# Patient Record
Sex: MALE | Race: WHITE | NOT HISPANIC OR LATINO | Employment: FULL TIME | URBAN - METROPOLITAN AREA
[De-identification: names, ages, dates, MRNs, and addresses within clinical notes are randomized per-mention and may not be internally consistent; named-entity substitution may affect disease eponyms.]

---

## 2017-10-20 ENCOUNTER — APPOINTMENT (OUTPATIENT)
Dept: LAB | Facility: CLINIC | Age: 37
End: 2017-10-20
Payer: COMMERCIAL

## 2017-10-20 ENCOUNTER — TRANSCRIBE ORDERS (OUTPATIENT)
Dept: LAB | Facility: CLINIC | Age: 37
End: 2017-10-20

## 2017-10-20 DIAGNOSIS — J35.8 ADENOID VEGETATIONS: ICD-10-CM

## 2017-10-20 DIAGNOSIS — D68.8 FAMILIAL MULTIPLE FACTOR DEFICIENCY SYNDROME (HCC): Primary | ICD-10-CM

## 2017-10-20 LAB
APTT PPP: 29 SECONDS (ref 23–35)
BASOPHILS # BLD AUTO: 0.04 THOUSANDS/ΜL (ref 0–0.1)
BASOPHILS NFR BLD AUTO: 1 % (ref 0–1)
EOSINOPHIL # BLD AUTO: 0.36 THOUSAND/ΜL (ref 0–0.61)
EOSINOPHIL NFR BLD AUTO: 7 % (ref 0–6)
ERYTHROCYTE [DISTWIDTH] IN BLOOD BY AUTOMATED COUNT: 12.1 % (ref 11.6–15.1)
HCT VFR BLD AUTO: 41.6 % (ref 36.5–49.3)
HGB BLD-MCNC: 13.9 G/DL (ref 12–17)
INR PPP: 0.99 (ref 0.86–1.16)
LYMPHOCYTES # BLD AUTO: 1.95 THOUSANDS/ΜL (ref 0.6–4.47)
LYMPHOCYTES NFR BLD AUTO: 36 % (ref 14–44)
MCH RBC QN AUTO: 30.2 PG (ref 26.8–34.3)
MCHC RBC AUTO-ENTMCNC: 33.4 G/DL (ref 31.4–37.4)
MCV RBC AUTO: 90 FL (ref 82–98)
MONOCYTES # BLD AUTO: 0.42 THOUSAND/ΜL (ref 0.17–1.22)
MONOCYTES NFR BLD AUTO: 8 % (ref 4–12)
NEUTROPHILS # BLD AUTO: 2.66 THOUSANDS/ΜL (ref 1.85–7.62)
NEUTS SEG NFR BLD AUTO: 48 % (ref 43–75)
NRBC BLD AUTO-RTO: 0 /100 WBCS
PLATELET # BLD AUTO: 220 THOUSANDS/UL (ref 149–390)
PMV BLD AUTO: 10.3 FL (ref 8.9–12.7)
PROTHROMBIN TIME: 13.1 SECONDS (ref 12.1–14.4)
RBC # BLD AUTO: 4.6 MILLION/UL (ref 3.88–5.62)
WBC # BLD AUTO: 5.44 THOUSAND/UL (ref 4.31–10.16)

## 2017-10-20 PROCEDURE — 85610 PROTHROMBIN TIME: CPT

## 2017-10-20 PROCEDURE — 85730 THROMBOPLASTIN TIME PARTIAL: CPT

## 2017-10-20 PROCEDURE — 36415 COLL VENOUS BLD VENIPUNCTURE: CPT

## 2017-10-20 PROCEDURE — 85025 COMPLETE CBC W/AUTO DIFF WBC: CPT

## 2017-10-20 NOTE — PRE-PROCEDURE INSTRUCTIONS
No outpatient prescriptions have been marked as taking for the 10/31/17 encounter [facility] Encounter)  Patient denies being on any medications

## 2017-10-20 NOTE — PROGRESS NOTES
Instructed patient on use of Chlorhexidine for preoperative bathing per hospital protocol  Also instructed patient to stop NSAIDS and supplements one week preop

## 2017-10-30 ENCOUNTER — ANESTHESIA EVENT (OUTPATIENT)
Dept: PERIOP | Facility: HOSPITAL | Age: 37
End: 2017-10-30
Payer: COMMERCIAL

## 2017-10-31 ENCOUNTER — HOSPITAL ENCOUNTER (OUTPATIENT)
Facility: HOSPITAL | Age: 37
Setting detail: OUTPATIENT SURGERY
Discharge: HOME | End: 2017-10-31
Attending: OTOLARYNGOLOGY | Admitting: OTOLARYNGOLOGY
Payer: COMMERCIAL

## 2017-10-31 ENCOUNTER — ANESTHESIA (OUTPATIENT)
Dept: PERIOP | Facility: HOSPITAL | Age: 37
End: 2017-10-31
Payer: COMMERCIAL

## 2017-10-31 ENCOUNTER — ANESTHESIA EVENT (OUTPATIENT)
Dept: PERIOP | Facility: HOSPITAL | Age: 37
End: 2017-10-31
Payer: COMMERCIAL

## 2017-10-31 VITALS
SYSTOLIC BLOOD PRESSURE: 104 MMHG | DIASTOLIC BLOOD PRESSURE: 53 MMHG | TEMPERATURE: 97.6 F | WEIGHT: 150 LBS | OXYGEN SATURATION: 95 % | RESPIRATION RATE: 16 BRPM | BODY MASS INDEX: 22.73 KG/M2 | HEART RATE: 50 BPM | HEIGHT: 68 IN

## 2017-10-31 DIAGNOSIS — J35.8 OTHER CHRONIC DISEASES OF TONSILS AND ADENOIDS (CODE): ICD-10-CM

## 2017-10-31 PROCEDURE — 88304 TISSUE EXAM BY PATHOLOGIST: CPT | Performed by: OTOLARYNGOLOGY

## 2017-10-31 RX ORDER — ONDANSETRON 2 MG/ML
4 INJECTION INTRAMUSCULAR; INTRAVENOUS ONCE AS NEEDED
Status: DISCONTINUED | OUTPATIENT
Start: 2017-10-31 | End: 2017-10-31 | Stop reason: HOSPADM

## 2017-10-31 RX ORDER — GLYCOPYRROLATE 0.2 MG/ML
INJECTION INTRAMUSCULAR; INTRAVENOUS AS NEEDED
Status: DISCONTINUED | OUTPATIENT
Start: 2017-10-31 | End: 2017-10-31 | Stop reason: SURG

## 2017-10-31 RX ORDER — OXYCODONE HYDROCHLORIDE AND ACETAMINOPHEN 5; 325 MG/1; MG/1
TABLET ORAL
Refills: 0 | Status: ON HOLD
Start: 2017-10-31 | End: 2019-10-31

## 2017-10-31 RX ORDER — ROCURONIUM BROMIDE 10 MG/ML
INJECTION, SOLUTION INTRAVENOUS AS NEEDED
Status: DISCONTINUED | OUTPATIENT
Start: 2017-10-31 | End: 2017-10-31 | Stop reason: SURG

## 2017-10-31 RX ORDER — SODIUM CHLORIDE 9 MG/ML
INJECTION, SOLUTION INTRAVENOUS AS NEEDED
Status: DISCONTINUED | OUTPATIENT
Start: 2017-10-31 | End: 2017-10-31 | Stop reason: HOSPADM

## 2017-10-31 RX ORDER — PROPOFOL 10 MG/ML
INJECTION, EMULSION INTRAVENOUS AS NEEDED
Status: DISCONTINUED | OUTPATIENT
Start: 2017-10-31 | End: 2017-10-31 | Stop reason: SURG

## 2017-10-31 RX ORDER — FENTANYL CITRATE/PF 50 MCG/ML
50 SYRINGE (ML) INJECTION
Status: DISCONTINUED | OUTPATIENT
Start: 2017-10-31 | End: 2017-10-31 | Stop reason: HOSPADM

## 2017-10-31 RX ORDER — SCOLOPAMINE TRANSDERMAL SYSTEM 1 MG/1
1 PATCH, EXTENDED RELEASE TRANSDERMAL ONCE AS NEEDED
Status: DISCONTINUED | OUTPATIENT
Start: 2017-10-31 | End: 2017-10-31 | Stop reason: HOSPADM

## 2017-10-31 RX ORDER — ONDANSETRON 2 MG/ML
4 INJECTION INTRAMUSCULAR; INTRAVENOUS EVERY 6 HOURS PRN
Status: DISCONTINUED | OUTPATIENT
Start: 2017-10-31 | End: 2017-10-31 | Stop reason: HOSPADM

## 2017-10-31 RX ORDER — MAGNESIUM HYDROXIDE 1200 MG/15ML
LIQUID ORAL AS NEEDED
Status: DISCONTINUED | OUTPATIENT
Start: 2017-10-31 | End: 2017-10-31 | Stop reason: HOSPADM

## 2017-10-31 RX ORDER — SODIUM CHLORIDE 9 MG/ML
125 INJECTION, SOLUTION INTRAVENOUS CONTINUOUS
Status: DISCONTINUED | OUTPATIENT
Start: 2017-10-31 | End: 2017-10-31 | Stop reason: HOSPADM

## 2017-10-31 RX ORDER — PREDNISONE 20 MG/1
TABLET ORAL
Refills: 0 | Status: ON HOLD
Start: 2017-10-31 | End: 2019-10-31

## 2017-10-31 RX ORDER — OXYCODONE HYDROCHLORIDE AND ACETAMINOPHEN 5; 325 MG/1; MG/1
2 TABLET ORAL EVERY 4 HOURS PRN
Status: DISCONTINUED | OUTPATIENT
Start: 2017-10-31 | End: 2017-10-31 | Stop reason: HOSPADM

## 2017-10-31 RX ORDER — FENTANYL CITRATE/PF 50 MCG/ML
25 SYRINGE (ML) INJECTION
Status: DISCONTINUED | OUTPATIENT
Start: 2017-10-31 | End: 2017-10-31 | Stop reason: HOSPADM

## 2017-10-31 RX ORDER — FENTANYL CITRATE 50 UG/ML
INJECTION, SOLUTION INTRAMUSCULAR; INTRAVENOUS AS NEEDED
Status: DISCONTINUED | OUTPATIENT
Start: 2017-10-31 | End: 2017-10-31 | Stop reason: SURG

## 2017-10-31 RX ORDER — SUCCINYLCHOLINE CHLORIDE 20 MG/ML
INJECTION INTRAMUSCULAR; INTRAVENOUS AS NEEDED
Status: DISCONTINUED | OUTPATIENT
Start: 2017-10-31 | End: 2017-10-31 | Stop reason: SURG

## 2017-10-31 RX ORDER — MIDAZOLAM HYDROCHLORIDE 1 MG/ML
INJECTION INTRAMUSCULAR; INTRAVENOUS AS NEEDED
Status: DISCONTINUED | OUTPATIENT
Start: 2017-10-31 | End: 2017-10-31 | Stop reason: SURG

## 2017-10-31 RX ADMIN — FENTANYL CITRATE 100 MCG: 50 INJECTION, SOLUTION INTRAMUSCULAR; INTRAVENOUS at 11:34

## 2017-10-31 RX ADMIN — ONDANSETRON HYDROCHLORIDE 4 MG: 2 INJECTION, SOLUTION INTRAVENOUS at 09:19

## 2017-10-31 RX ADMIN — SUCCINYLCHOLINE CHLORIDE 120 MG: 20 INJECTION, SOLUTION INTRAMUSCULAR; INTRAVENOUS at 11:35

## 2017-10-31 RX ADMIN — FENTANYL CITRATE 100 MCG: 50 INJECTION, SOLUTION INTRAMUSCULAR; INTRAVENOUS at 09:01

## 2017-10-31 RX ADMIN — FENTANYL CITRATE 50 MCG: 50 INJECTION, SOLUTION INTRAMUSCULAR; INTRAVENOUS at 10:05

## 2017-10-31 RX ADMIN — SODIUM CHLORIDE 125 ML/HR: 0.9 INJECTION, SOLUTION INTRAVENOUS at 09:59

## 2017-10-31 RX ADMIN — SODIUM CHLORIDE: 0.9 INJECTION, SOLUTION INTRAVENOUS at 11:50

## 2017-10-31 RX ADMIN — OXYCODONE HYDROCHLORIDE AND ACETAMINOPHEN 1 TABLET: 5; 325 TABLET ORAL at 13:54

## 2017-10-31 RX ADMIN — NEOSTIGMINE METHYLSULFATE 1.5 MG: 1 INJECTION, SOLUTION INTRAMUSCULAR; INTRAVENOUS; SUBCUTANEOUS at 11:49

## 2017-10-31 RX ADMIN — PROPOFOL 200 MG: 10 INJECTION, EMULSION INTRAVENOUS at 09:03

## 2017-10-31 RX ADMIN — ROCURONIUM BROMIDE 30 MG: 10 INJECTION, SOLUTION INTRAVENOUS at 09:03

## 2017-10-31 RX ADMIN — SODIUM CHLORIDE 125 ML/HR: 0.9 INJECTION, SOLUTION INTRAVENOUS at 13:57

## 2017-10-31 RX ADMIN — NEOSTIGMINE METHYLSULFATE 3 MG: 1 INJECTION, SOLUTION INTRAMUSCULAR; INTRAVENOUS; SUBCUTANEOUS at 09:31

## 2017-10-31 RX ADMIN — DEXAMETHASONE SODIUM PHOSPHATE 8 MG: 10 INJECTION INTRAMUSCULAR; INTRAVENOUS at 09:10

## 2017-10-31 RX ADMIN — PROPOFOL 200 MG: 10 INJECTION, EMULSION INTRAVENOUS at 11:35

## 2017-10-31 RX ADMIN — ROCURONIUM BROMIDE 5 MG: 10 INJECTION, SOLUTION INTRAVENOUS at 11:35

## 2017-10-31 RX ADMIN — SODIUM CHLORIDE 125 ML/HR: 0.9 INJECTION, SOLUTION INTRAVENOUS at 08:02

## 2017-10-31 RX ADMIN — ROCURONIUM BROMIDE 10 MG: 10 INJECTION, SOLUTION INTRAVENOUS at 11:37

## 2017-10-31 RX ADMIN — MIDAZOLAM HYDROCHLORIDE 2 MG: 1 INJECTION, SOLUTION INTRAMUSCULAR; INTRAVENOUS at 08:59

## 2017-10-31 RX ADMIN — SCOPALAMINE 1 PATCH: 1 PATCH, EXTENDED RELEASE TRANSDERMAL at 08:01

## 2017-10-31 RX ADMIN — GLYCOPYRROLATE 0.5 MG: 0.2 INJECTION, SOLUTION INTRAMUSCULAR; INTRAVENOUS at 09:31

## 2017-10-31 RX ADMIN — GLYCOPYRROLATE 0.2 MG: 0.2 INJECTION, SOLUTION INTRAMUSCULAR; INTRAVENOUS at 11:50

## 2017-10-31 RX ADMIN — MIDAZOLAM HYDROCHLORIDE 2 MG: 1 INJECTION, SOLUTION INTRAMUSCULAR; INTRAVENOUS at 11:30

## 2017-10-31 RX ADMIN — MIDAZOLAM HYDROCHLORIDE 2 MG: 1 INJECTION, SOLUTION INTRAMUSCULAR; INTRAVENOUS at 11:40

## 2017-10-31 RX ADMIN — SODIUM CHLORIDE: 0.9 INJECTION, SOLUTION INTRAVENOUS at 08:57

## 2017-10-31 RX ADMIN — SODIUM CHLORIDE 125 ML/HR: 0.9 INJECTION, SOLUTION INTRAVENOUS at 12:08

## 2017-10-31 NOTE — OP NOTE
OPERATIVE REPORT  PATIENT NAME: Dulce Gurrola    :  1980  MRN: [id number]  Pt Location: [facility] OR ROOM 03    SURGERY DATE: 10/31/2017    Surgeon(s) and Role:     * Jeanie Sims DO - Primary    Preop Diagnosis:  Other chronic diseases of tonsils and adenoids (CODE) [J35 8]    Post-Op Diagnosis Codes:     * Other chronic diseases of tonsils and adenoids (CODE) [J35 8]    Procedure(s) (LRB):  TONSILLECTOMY (N/A)    Specimen(s):  ID Type Source Tests Collected by Time Destination   1 : BL Tonsils Tissue Tonsil TISSUE EXAM Jeanie Sims DO 10/31/2017 0441        Estimated Blood Loss:   10 mL    Drains:       Anesthesia Type:   General    Operative Indications: Other chronic diseases of tonsils and adenoids (CODE) [J35 8]      Operative Findings:  Cryptic tonsils with multiple pustules and tonsilliths     Complications:   None    Procedure and Technique:  Patient was identified in the holding area and taken to the OR  Placed on the OR table in supine position  Table was turned 90 degrees and prepped and draped in usual fashion for the above procedure  Time out was obtained and all information correct and agreed upon by surgeon, OR staff and anesthesia  The oral cavity was opened using a McGyver mouthgag and held in place with harris stand suspension  Evaluation of the oral cavity revealed that the left tonsil was grade 2 and the right tonsil was grade 2  Attention was first turned to the right tonsil  This was grasped with a curved Abena forceps and pulled medially  Using the coblation wand on the standard settings the tonsil was removed from the tonsillar fossa using the coblation setting  Any visible vessels which were seen just under the tissue were coagulated with the coagulation setting on the unit  The same procedure was then completed on the opposite side  At the completion of the tonsillectomy the fossae were dry    Soft suction catheter was then passed into the esophagus and stomach to remove any gastric contents and then removed  The mouthgag was let down and then reopened to evaluated the oropharyngeal area to make sure that there was no bleeding  Once confirmed, the mouthgag was removed and the bed turned back 90 degrees towards anesthesia  The patient was awoken, extubated and taken to the PACU in stable condition  I was present for the entire procedure    Patient Disposition:  PACU     SIGNATURE: Austin Domingo DO  DATE: October 31, 2017  TIME: 11:18 AM      Addendum:  After starting my following case I was called by the PACU nurses stating that there was some bleeding from the tonsillar fossa on the left  They have the patient irrigate with ice water gargles and the patient was to be visualized following that surgery  Upon seeing the patient he had significant clot in the left tonsillar fossa with some bright red blood  It was deemed appropriate to return to the operating room in an emergency setting to control the bleeding and cauterize the area

## 2017-10-31 NOTE — OP NOTE
OPERATIVE REPORT  PATIENT NAME: Bhavin Lorenzo    :  1980  MRN: [id number]  Pt Location: [facility] 03    SURGERY DATE: 10/31/2017    Surgeon(s) and Role:     * DO Andrea Ruiz Primary    Preop Diagnosis:  Post tonsillectomy hemorrhage       Procedure(s):  CONTROL BLEED POST TONSILLECTOMY    Specimen(s):  * No specimens in log *    Estimated Blood Loss:   10 mL from oral cavity then suctioned 150 mL of blood from stomach    Drains:       Anesthesia Type:   General    Operative Indications:  Postoperative tonsillectomy hemorrhage      Operative Findings:  Clot in left tonsillar fossa  Clot suction and a small area of bleeding noted in the mid pole  Complications:   None    Procedure and Technique:  Patient was taken back emergently to the operating room  Consent was not obtained  I had a verbal consent from both the patient as well as his father but written was not obtained secondary to the fact that the patient was acutely bleeding  He was placed under general anesthesia in the supine position on the OR table  Table was turned 90° and he was prepped and draped in the usual fashion for the above surgery  Formal time-out was obtained and patient needed a brother Procedure it to be correct  Ritger mouth gag was inserted into the oral cavity and was suspended on a Santos stand  Clot was removed from the tonsillar fossa  In the midportion of the tonsillar fossa there was some acute bleeding noted which was cauterized with the suction Bovie on 20 of coagulation  I rinsed the oral cavity with 40 mL of saline and then suctioned this out  I also cauterized the inferior poles of each inferior tonsillar fossa with the same instrument  Soft suction was then used to suction out the stomach and 150 cc of blood was suctioned  I then let the McIvor mouth gag down for approximately 1 minute and then reopened the oral cavity and there was no bleeding or clot  Table was turned back to normal position  Patient was woken, extubated and taken to recovery room in stable condition for the 2nd time     I was present for the entire procedure    Patient Disposition:  PACU     SIGNATURE: Teddy Montemayor DO  DATE: October 31, 2017  TIME: 11:44 AM

## 2017-10-31 NOTE — ANESTHESIA PREPROCEDURE EVALUATION
Review of Systems/Medical History  Patient summary reviewed  Chart reviewed  History of anesthetic complications PONV    Cardiovascular   Pulmonary  Smoker ex-smoker , ,        GI/Hepatic  Negative GI/hepatic ROS               Endo/Other  Arthritis     GYN       Hematology  Negative hematology ROS      Musculoskeletal  Negative musculoskeletal ROS        Neurology  Negative neurology ROS      Psychology   Negative psychology ROS            Physical Exam    Airway    Mallampati score: I  TM Distance: >3 FB  Neck ROM: full     Dental   No notable dental hx     Cardiovascular  Rhythm: regular, Rate: normal, Cardiovascular exam normal    Pulmonary  Pulmonary exam normal Breath sounds clear to auscultation,     Other Findings        Anesthesia Plan  ASA Score- 2       Anesthesia Type- general with ASA Monitors  Additional Monitors:   Airway Plan:     Comment: SCOP patch ordered  Induction- intravenous  Informed Consent- Anesthetic plan and risks discussed with patient

## 2017-10-31 NOTE — ANESTHESIA POSTPROCEDURE EVALUATION
Post-Op Assessment Note      CV Status:  Stable    Mental Status:  Alert and awake    Hydration Status:  Euvolemic    PONV Controlled:  Controlled    Airway Patency:  Patent    Post Op Vitals Reviewed: Yes          Staff: Anesthesiologist           /53 (10/31/17 1345)    Temp      Pulse (!) 50 (10/31/17 1345)   Resp 16 (10/31/17 1345)    SpO2 95 % (10/31/17 1345)

## 2017-10-31 NOTE — ANESTHESIA POSTPROCEDURE EVALUATION
Post-Op Assessment Note      CV Status:  Stable    Mental Status:  Alert and awake    Hydration Status:  Euvolemic    PONV Controlled:  Controlled    Airway Patency:  Patent    Post Op Vitals Reviewed: Yes          Staff: Anesthesiologist           /64 (10/31/17 1030)    Temp     Pulse (!) 44 (10/31/17 1030)   Resp 14 (10/31/17 1030)    SpO2 99 % (10/31/17 1030)

## 2019-09-17 ENCOUNTER — APPOINTMENT (OUTPATIENT)
Dept: RADIOLOGY | Facility: MEDICAL CENTER | Age: 39
End: 2019-09-17
Payer: COMMERCIAL

## 2019-09-17 VITALS
DIASTOLIC BLOOD PRESSURE: 82 MMHG | BODY MASS INDEX: 25.9 KG/M2 | HEART RATE: 62 BPM | SYSTOLIC BLOOD PRESSURE: 134 MMHG | WEIGHT: 165 LBS | HEIGHT: 67 IN

## 2019-09-17 DIAGNOSIS — S49.92XA INJURY OF LEFT ACROMIOCLAVICULAR JOINT, INITIAL ENCOUNTER: Primary | ICD-10-CM

## 2019-09-17 DIAGNOSIS — M25.512 ACUTE PAIN OF LEFT SHOULDER: ICD-10-CM

## 2019-09-17 PROCEDURE — 73030 X-RAY EXAM OF SHOULDER: CPT

## 2019-09-17 PROCEDURE — 20610 DRAIN/INJ JOINT/BURSA W/O US: CPT | Performed by: ORTHOPAEDIC SURGERY

## 2019-09-17 PROCEDURE — 99203 OFFICE O/P NEW LOW 30 MIN: CPT | Performed by: ORTHOPAEDIC SURGERY

## 2019-09-17 RX ORDER — BETAMETHASONE SODIUM PHOSPHATE AND BETAMETHASONE ACETATE 3; 3 MG/ML; MG/ML
12 INJECTION, SUSPENSION INTRA-ARTICULAR; INTRALESIONAL; INTRAMUSCULAR; SOFT TISSUE
Status: COMPLETED | OUTPATIENT
Start: 2019-09-17 | End: 2019-09-17

## 2019-09-17 RX ORDER — IBUPROFEN 800 MG/1
400 TABLET ORAL EVERY 6 HOURS PRN
COMMUNITY

## 2019-09-17 RX ORDER — LIDOCAINE HYDROCHLORIDE 10 MG/ML
2 INJECTION, SOLUTION INFILTRATION; PERINEURAL
Status: COMPLETED | OUTPATIENT
Start: 2019-09-17 | End: 2019-09-17

## 2019-09-17 RX ADMIN — LIDOCAINE HYDROCHLORIDE 2 ML: 10 INJECTION, SOLUTION INFILTRATION; PERINEURAL at 11:24

## 2019-09-17 RX ADMIN — BETAMETHASONE SODIUM PHOSPHATE AND BETAMETHASONE ACETATE 12 MG: 3; 3 INJECTION, SUSPENSION INTRA-ARTICULAR; INTRALESIONAL; INTRAMUSCULAR; SOFT TISSUE at 11:24

## 2019-09-17 NOTE — LETTER
September 17, 2019     Patient: Stefania Vincent   YOB: 1980   Date of Visit: 9/17/2019       To Whom It May Concern: It is my medical opinion that Stefania Vincent may return to light duty immediately with the following restrictions:  no lifting over 10 lb with the left upper extremity for the next 6 weeks  patient needs to undergo physical therapy program for strength   if patient does not improve he will need an MRI of the left shoulder    If you have any questions or concerns, please don't hesitate to call           Sincerely,        Lucila Stanley MD    CC: Stefania Carlton

## 2019-09-17 NOTE — PROGRESS NOTES
Chief Complaint   left shoulder pain     History Of Presenting Illness  Boris Reynolds 1980 presents with   Left shoulder pain  Patient is right-handed   Patient has lifting at 10 lb box at work on August 23rd, 2019  Patient felt acute pain on the superior anterior aspect of the left shoulder  Not improved with rest   Patient size workman's comp doctor who diagnosed him to have an AC joint sprain  Patient presents for evaluation with x-rays  Pain mainly located in the superior aspect of the shoulder joint in the [facility] joint  Aggravated by overhead activities  Decreased with rest       Current Medications  Current Outpatient Medications   Medication Sig Dispense Refill    ibuprofen (MOTRIN) 800 mg tablet Take by mouth every 8 (eight) hours as needed for mild pain      oxyCODONE-acetaminophen (PERCOCET) 5-325 mg per tablet Take as directed at home (Patient not taking: Reported on 9/17/2019)  0    oxyCODONE-acetaminophen (PERCOCET) 5-325 mg per tablet Take as directed at home (Patient not taking: Reported on 9/17/2019)  0    predniSONE 20 mg tablet Take as directed at home (Patient not taking: Reported on 9/17/2019)  0    predniSONE 20 mg tablet Take as directed at home (Patient not taking: Reported on 9/17/2019)  0     No current facility-administered medications for this visit  Current Problems    Active Problems: There are no active problems to display for this patient          Review of Systems:    General: negative for - chills, fatigue, fever,  weight gain or weight loss  Psychological: negative for - anxiety, behavioral disorder, concentration difficulties  Ophthalmic: negative for - blurry vision, decreased vision, double vision,      Past Medical History:   Past Medical History:   Diagnosis Date    DDD (degenerative disc disease), cervical     Neck pain     Occasional    PONV (postoperative nausea and vomiting)     Seasonal allergies     Wears glasses        Past Surgical History:   Past Surgical History:   Procedure Laterality Date    CONTROL BLEED POST TONSILLECTOMY  10/31/2017    Procedure: CONTROL BLEED POST TONSILLECTOMY;  Surgeon: Zack Brown DO;  [facility];  Service: ENT    HEMORRHOID SURGERY      x2    NM REMOVAL OF TONSILS,12+ Y/O N/A 10/31/2017    Procedure: TONSILLECTOMY;  Surgeon: Zack Brown DO;  [facility];  Service: ENT    SHOULDER SURGERY Right     x2- decompression    WISDOM TOOTH EXTRACTION         Family History:  Family history reviewed and non-contributory  Family History   Problem Relation Age of Onset    Graves' disease Mother     Hypertension Father     Cancer Paternal Uncle        Social History:  Social History     Socioeconomic History    Marital status: /Civil Union     Spouse name: None    Number of children: None    Years of education: None    Highest education level: None   Occupational History    None   Social Needs    Financial resource strain: None    Food insecurity:     Worry: None     Inability: None    Transportation needs:     Medical: None     Non-medical: None   Tobacco Use    Smoking status: Former Smoker     Packs/day: 1 00     Years: 12 00     Pack years: 12 00     Types: Cigarettes     Last attempt to quit:      Years since quittin 7    Smokeless tobacco: Never Used   Substance and Sexual Activity    Alcohol use: Yes     Comment: 1 weekly    Drug use: No    Sexual activity: None   Lifestyle    Physical activity:     Days per week: None     Minutes per session: None    Stress: None   Relationships    Social connections:     Talks on phone: None     Gets together: None     Attends Scientologist service: None     Active member of club or organization: None     Attends meetings of clubs or organizations: None     Relationship status: None    Intimate partner violence:     Fear of current or ex partner: None     Emotionally abused: None     Physically abused: None     Forced sexual activity: None   Other Topics Concern    None   Social History Narrative    None       Allergies:   No Known Allergies        Physical ExaminationBP 134/82   Pulse 62   Ht 5' 7" (1 702 m)   Wt 74 8 kg (165 lb)   BMI 25 84 kg/m²   Gen: Alert and oriented to person, place, time  HEENT: EOMI, eyes clear, moist mucus membranes, hearing intact      Orthopedic Exam    Left shoulder examination   slight swelling over the [facility] [place] joint   tenderness present with [facility] [place] joint palpation here reproduces symptoms   AD duction causes him discomfort of the [facility] [place] joint   Cuff is intact   no distal deficits   radiographs satisfactory          Impression   left AC joint sprain      Plan     discussed treatment with the patient   left AC joint injected with steroid and local anesthetic with good temporary relief of symptoms   patient start a physical therapy program and can return to modified work   patient ice, use over-the-counter pain medication and do home exercise program   follow-up in 4 weeks   if the patient is no better we will arrange for an MRI  Large joint arthrocentesis  Date/Time: 9/17/2019 11:24 AM  Consent given by: patient  Site marked: site marked  Timeout: Immediately prior to procedure a time out was called to verify the correct patient, procedure, equipment, support staff and site/side marked as required   Supporting Documentation  Indications: pain, diagnostic evaluation and joint swelling   Procedure Details  Location: shoulder - Shoulder joint:  left AC joint    Preparation: Patient was prepped and draped in the usual sterile fashion  Needle size: 22 G  Ultrasound guidance: no  Approach: posterolateral  Medications administered: 12 mg betamethasone acetate-betamethasone sodium phosphate 6 (3-3) mg/mL; 2 mL lidocaine 1 %    Patient tolerance: patient tolerated the procedure well with no immediate complications  Dressing:  Sterile dressing applied        Roberto Cannon MD        Portions of the record may have been created with voice recognition software  Occasional wrong word or "sound a like" substitutions may have occurred due to the inherent limitations of voice recognition software  Read the chart carefully and recognize, using context, where substitutions have occurred

## 2019-09-17 NOTE — PATIENT INSTRUCTIONS
Exercises for Shoulder Abduction and Adduction   AMBULATORY CARE:   Shoulder abduction and adduction exercises  work the muscles at the back of your shoulder and your upper back  Before you exercise:  Warm up and stretch before you exercise  Walk or ride a stationary bike for 5 to 10 minutes to help you warm up  Stretching helps increase range of motion  It may also decrease muscle soreness and help prevent another injury  Your healthcare provider will tell you which of the following stretches to do:  · Crossover arm stretch:  Relax your shoulders  Hold your upper arm with the opposite hand  Pull your arm across your chest until you feel a stretch  Hold the stretch for 30 seconds  Return to the starting position  · Shoulder flexion stretch:  Stand facing a wall  Slowly walk your fingers up the wall until you feel a stretch  Hold the stretch for 30 seconds  Return to the starting position  · Sleeper stretch:  Lie on your injured side on a firm, flat surface  Bend the elbow of your injured arm 90° with your hand facing up  Use your arm that is not injured to slowly push your injured arm down  Stop when you feel a stretch at the back of your injured shoulder  Hold the stretch for 30 seconds  Slowly return to the starting position  Contact your healthcare provider if:   · You have sharp or worsening pain during exercise or at rest     · You have questions or concerns about your shoulder exercises  How to exercise with a weight:  Your healthcare provider will tell you how much weight to use  · Shoulder abduction:  Stand and hold a weight in your hand with your palm facing your body  Slowly raise your arm to the side with your thumb pointing up  Then raise your arm over your head as far as you can without pain  Hold this position for as long as directed  Do not raise your arm over your head unless your healthcare provider says it is okay  · Shoulder adduction:  Lie on your back on a firm surface   Extend your arm out to a "T " Bend your elbow so your forearm in the air  Hold a weight in your hand  Slowly raise your arm toward the ceiling and straighten your elbow  Hold this position for as long as directed  Slowly return to the starting position  How to exercise with an exercise band:   · Shoulder abduction:  Wrap the exercise band around a heavy, stable object near your foot  Grab the band with the hand of your injured shoulder  Keep your arm straight  Slowly raise your arm to the side with your thumb pointing up  Then, slowly pull the band over your head as far as you can without pain  Do not raise your arm over your head unless your healthcare provider says it is okay  Do not let your shoulder shrug  Hold this position for as long as directed  Slowly return to the starting position  · Shoulder adduction:  Wrap the exercise band around a heavy, stable object  Stand and face away from where the band is anchored  Hold each end of the band in both hands with your elbows bent  Your elbows should not be behind your body  Keep your arms parallel to the floor and slowly straighten your elbows  Hold this position for as long as directed  Slowly return to the starting position  Follow up with your physical therapist as directed:  Write down your questions so you remember to ask them at your visits  © 2017 [employer] Information is for End User's use only and may not be sold, redistributed or otherwise used for commercial purposes  All illustrations and images included in CareNotes® are the copyrighted property of [employer] , [employer]  or [employer]  The above information is an  only  It is not intended as medical advice for individual conditions or treatments  Talk to your doctor, nurse or pharmacist before following any medical regimen to see if it is safe and effective for you

## 2019-09-20 ENCOUNTER — EVALUATION (OUTPATIENT)
Dept: PHYSICAL THERAPY | Facility: CLINIC | Age: 39
End: 2019-09-20
Payer: OTHER MISCELLANEOUS

## 2019-09-20 VITALS — SYSTOLIC BLOOD PRESSURE: 130 MMHG | DIASTOLIC BLOOD PRESSURE: 88 MMHG

## 2019-09-20 DIAGNOSIS — S49.92XD ACROMIOCLAVICULAR JOINT INJURY, LEFT, SUBSEQUENT ENCOUNTER: Primary | ICD-10-CM

## 2019-09-20 DIAGNOSIS — M25.512 ACUTE PAIN OF LEFT SHOULDER: ICD-10-CM

## 2019-09-20 PROCEDURE — 97140 MANUAL THERAPY 1/> REGIONS: CPT | Performed by: PHYSICAL THERAPIST

## 2019-09-20 PROCEDURE — 97162 PT EVAL MOD COMPLEX 30 MIN: CPT | Performed by: PHYSICAL THERAPIST

## 2019-09-20 PROCEDURE — 97535 SELF CARE MNGMENT TRAINING: CPT | Performed by: PHYSICAL THERAPIST

## 2019-09-20 NOTE — PROGRESS NOTES
PT Evaluation     Today's date: 2019  Patient name: Dimple Grande  : 1980  MRN: [id number]  Referring provider: Ayan Wilkes MD  Dx:   Encounter Diagnosis     ICD-10-CM    1  Acromioclavicular joint injury, left, subsequent encounter S49  92XD    2  Acute pain of left shoulder M25 512                   Assessment  Assessment details: Dimple Grande is a 44 y o  male who presents with complaints of acute L shoulder pain from work related injury  No further referral appears necessary at this time based upon examination results  S/s consistent with AC joint sprain  Probable movement impairment diagnosis of scapular dyskinesis resulting in pathoanatomical symptoms of pain and clicking in L shoulder and limiting his ability to perform the aforementioned functional activities  Etiologic factors include repetitive poor body mechanics  Prognosis is good given HEP compliance and PT 3x/wk  Please contact me if you have any questions or recommendations  Thank you for the opportunity to share in  Abhijit's care       Impairments: abnormal or restricted ROM, activity intolerance, impaired physical strength, pain with function and scapular dyskinesis  Functional limitations: restricted lifting <10 lbs, pain with behind the back and OHA, Understanding of Dx/Px/POC: good   Prognosis: good    Goals  1) In 4 weeks patient will report reduced pain 50% or greater  2) In 4 weeks patient will demonstrate L shoulder AROM WFL all directions  3) In 4 weeks patient will demonstrate improved scapular mobility with good control during scaption  4) In 4 weeks patient will demonstrate independence with HEP  5) In 4 weeks patient will demonstrate readiness for progression to strengthening with > 10 lbs     Plan  Patient would benefit from: skilled physical therapy  Referral necessary: No  Planned modality interventions: cryotherapy, TENS, unattended electrical stimulation and thermotherapy: hydrocollator packs  Planned therapy interventions: joint mobilization, manual therapy, massage, Maloney taping, home exercise program, functional ROM exercises, flexibility, therapeutic exercise, stretching, strengthening, patient education, self care and postural training  Frequency: 3x week  Duration in weeks: 4  Plan of Care beginning date: 2019  Plan of Care expiration date: 10/18/2019  Treatment plan discussed with: patient        Subjective Evaluation    History of Present Illness  Date of onset: 2019  Mechanism of injury: Patient presents to PT after work related injury  Patient is employed as a  at [employer]  Reports injury on 19 to L shoulder when attempting to lift a 10 lb box from about waist height and felt an immediate sharp pain in the L shoulder  He stopped working at that moment, reported to his manager, went on lunch and the pain worsened  Saw WC doc that same day  Had xrays but no MRI at this time  Patient saw ortho doc (referring Dr Tony Lott) on  and received steroid injection which provided much relief from pain on the day of and day after the injection  Reports the "grinding" is also less since the injection  Pain is primarily L shoulder superior and anterior  Occasionally feels "sore" into L elbow and forearm  Reports intermittent "numbness" along lateral aspect of forearm if he rests arm on pillow  Pain overall has gradually improved since the injury occurred  He uses a sling when out in public to avoid using his arm    Pain  Current pain ratin  At best pain ratin  At worst pain ratin  Quality: dull ache, grinding and sharp  Relieving factors: ice and medications  Aggravating factors: overhead activity and lifting    Social Support  Lives in: [facility]  Lives with: spouse and young children    Employment status: not working (employed as a  at [employer], currently injured)  Hand dominance: left  Exercise history: daily walks    Treatments  Current treatment: injection treatment and medication  Patient Goals  Patient goals for therapy: return to work and decreased pain          Objective     Tenderness     Left Shoulder   Tenderness in the [facility] joint, acromion, clavicle and coracoid process  No tenderness in the biceps tendon (proximal), bicipital groove, infraspinatus tendon, lateral scapula, medial scapula, scapular spine, SC joint, subscapularis tendon and supraspinatus tendon  Active Range of Motion   Cervical/Thoracic Spine       Cervical    Flexion:  WFL  Extension:  WFL  Left lateral flexion:  WFL  Right lateral flexion:  WFL  Left rotation:  WFL  Right rotation:  WFL  Left Shoulder   Flexion: 110 degrees with pain  Extension: 30 degrees   Abduction: 60 degrees with pain  External rotation BTH: T1 with pain  Internal rotation BTB: L2 with pain    Additional Active Range of Motion Details  Pain reproduced with all AROM  Extension, IR, ER >> abduction and flexion    Passive Range of Motion   Left Shoulder   Flexion: WFL  Abduction: 50 degrees with pain  External rotation 90°: 65 degrees   Internal rotation 90°: 40 degrees with pain    Scapular Mobility   Left Shoulder   Scapular Dyskinesis: grade I  Scapular mobility: good  Scapular Mobility with Shoulder to 90° FF   Scapular winging: minimal  Upward rotation: inadequate  Downward rotation: inadequate    Scapular Mobility beyond 90° FF   Scapular winging: minimal  Upward rotation: inadequate  Downward rotation: inadequate    Strength/Myotome Testing     Left Shoulder     Planes of Motion   Flexion: 4-   Extension: 4-   Abduction: 4-   External rotation at 0°: 4-   External rotation at 90°: 4-     Additional Strength Details  MMT tested with shoulder shoulder neutral (arm by side)    Tests     Left Shoulder   Positive anterior load and shift, scapular relocation  and scapular assistance test positive  Negative belly press, clunk, crank, Hawkin's, Neer's, painful arc, passive horizontal adduction and bicep load   Flowsheet Rows      Most Recent Value   PT/OT G-Codes   Current Score  41   Projected Score  64             Precautions: none  Re-evaluation due 10/18  Manual  9/20       SC jt mobs AP and inferior x       L shoulder PROM to jayda x                                   Exercise Diary  9/20       HEP instruction pendulums, scap retraction       Pulleys        Finger ladder        UT stretch        Levator stretch        Shoulder isometrics - all dir        Standing AAROM shoulder scaption w/cane        Standing scaption w/therapist  assist scap upward rotation        Pec stretch        Rhomboid stretch                                                                                            Modalities  9/20       Stim/CP post tx to L shoulder                            Patient was provided a home exercise program and demonstrated an understanding of exercises  Patient was advised to stop performing home exercise program if symptoms increase or new complaints developed  Verbal understanding demonstrated regarding home exercise program instructions

## 2019-09-23 ENCOUNTER — OFFICE VISIT (OUTPATIENT)
Dept: PHYSICAL THERAPY | Facility: CLINIC | Age: 39
End: 2019-09-23
Payer: OTHER MISCELLANEOUS

## 2019-09-23 DIAGNOSIS — M25.512 ACUTE PAIN OF LEFT SHOULDER: Primary | ICD-10-CM

## 2019-09-23 DIAGNOSIS — S49.92XD ACROMIOCLAVICULAR JOINT INJURY, LEFT, SUBSEQUENT ENCOUNTER: ICD-10-CM

## 2019-09-23 PROCEDURE — 97140 MANUAL THERAPY 1/> REGIONS: CPT

## 2019-09-23 PROCEDURE — 97110 THERAPEUTIC EXERCISES: CPT

## 2019-09-23 NOTE — PROGRESS NOTES
Daily Note     Today's date: 2019  Patient name: Jean-Pierre Hughes  : 1980  MRN: [id number]  Referring provider: Chapo Adair MD  Dx:   Encounter Diagnosis     ICD-10-CM    1  Acute pain of left shoulder M25 512    2  Acromioclavicular joint injury, left, subsequent encounter S49  92XD                   Subjective: "I go back to work tomorrow on [employer] duty  I have been trying to be more aware of my posture and it is really helping me "      Objective: See treatment diary below      Assessment: Tolerated treatment well  Initiated multiple new exercises as outlined in pt POC  Patient demonstrated fatigue post treatment and would benefit from continued PT      Plan: Continue per plan of care        Precautions: none  Re-evaluation due 10/18  Manual        SC jt mobs AP and inferior x NP      L shoulder PROM to jayda x x                                  Exercise Diary        HEP instruction pendulums, scap retraction       Pulleys  10" x10 Flex/Scap      Finger ladder  10" x10 Flex/Scap      UT stretch  L side only 30" x3      Levator stretch  L side only 30" x3      Shoulder isometrics - all dir        Standing AAROM shoulder scaption w/cane        Standing scaption w/therapist  assist scap upward rotation        Pec stretch  30" x3      Rhomboid stretch  30" x3 ea                                                                                          Modalities        Stim/CP post tx to L shoulder  NP

## 2019-09-25 ENCOUNTER — OFFICE VISIT (OUTPATIENT)
Dept: PHYSICAL THERAPY | Facility: CLINIC | Age: 39
End: 2019-09-25
Payer: OTHER MISCELLANEOUS

## 2019-09-25 DIAGNOSIS — S49.92XD ACROMIOCLAVICULAR JOINT INJURY, LEFT, SUBSEQUENT ENCOUNTER: ICD-10-CM

## 2019-09-25 DIAGNOSIS — M25.512 ACUTE PAIN OF LEFT SHOULDER: Primary | ICD-10-CM

## 2019-09-25 PROCEDURE — 97140 MANUAL THERAPY 1/> REGIONS: CPT

## 2019-09-25 PROCEDURE — 97110 THERAPEUTIC EXERCISES: CPT

## 2019-09-25 NOTE — PROGRESS NOTES
Daily Note     Today's date: 2019  Patient name: Tony Woody  : 1980  MRN: [id number]  Referring provider: Nahun Khoury MD  Dx:   Encounter Diagnosis     ICD-10-CM    1  Acute pain of left shoulder M25 512    2  Acromioclavicular joint injury, left, subsequent encounter S49  92XD                   Subjective: "I am a little sore today  I went back to work on light duty yesterday "       Objective: See treatment diary below      Assessment: Tolerated treatment well  Initiaited multiple strengthening exercises to increase strength and endurance of the L shoulder  No exacerbation of symptoms this date  Patient demonstrated fatigue post treatment and would benefit from continued PT      Plan: Continue per plan of care        Precautions: none  Re-evaluation due 10/18  Manual       SC jt mobs AP and inferior x NP      L shoulder PROM to jayda x x x                                 Exercise Diary       HEP instruction pendulums, scap retraction       Pulleys  10" x10 Flex/Scap 10" x10 Flex/Scap     Finger ladder  10" x10 Flex/Scap 10" x10 Flex/Scap     UT stretch  L side only 30" x3 L side only 30" x3     Levator stretch  L side only 30" x3 L side only 30" x3     Shoulder isometrics - all dir   5" x10 Flex/ER/IR     Standing AAROM shoulder scaption w/cane        Standing scaption w/therapist  assist scap upward rotation        Pec stretch  30" x3 30" x3      Rhomboid stretch  30" x3 ea 30" x3 ea     UBE    120 RPM 5' retro      MTP/LTP   Red TB 5" 2x10                                                                         Modalities       Stim/CP post tx to L shoulder  NP

## 2019-09-27 ENCOUNTER — OFFICE VISIT (OUTPATIENT)
Dept: PHYSICAL THERAPY | Facility: CLINIC | Age: 39
End: 2019-09-27
Payer: OTHER MISCELLANEOUS

## 2019-09-27 DIAGNOSIS — S49.92XD ACROMIOCLAVICULAR JOINT INJURY, LEFT, SUBSEQUENT ENCOUNTER: ICD-10-CM

## 2019-09-27 DIAGNOSIS — M25.512 ACUTE PAIN OF LEFT SHOULDER: Primary | ICD-10-CM

## 2019-09-27 PROCEDURE — 97110 THERAPEUTIC EXERCISES: CPT | Performed by: PHYSICAL THERAPIST

## 2019-09-27 PROCEDURE — 97140 MANUAL THERAPY 1/> REGIONS: CPT | Performed by: PHYSICAL THERAPIST

## 2019-09-27 NOTE — PROGRESS NOTES
Daily Note     Today's date: 2019  Patient name: Shin Fowler  : 1980  MRN: [id number]  Referring provider: Mary Samuels MD  Dx:   Encounter Diagnosis     ICD-10-CM    1  Acute pain of left shoulder M25 512    2  Acromioclavicular joint injury, left, subsequent encounter S49  92XD                   Subjective: Patient is sore today  "A good kind of sore" Reports he is glad to be coming to PT because he doesn't feel he could have transitioned back to work without any guidance  Objective: See treatment diary below      Assessment: Tolerated treatment well  No progressions made due to current c/o soreness  Patient demonstrated fatigue post treatment, exhibited good technique with therapeutic exercises and would benefit from continued PT      Plan: Continue per plan of care  Progress treatment as tolerated         Precautions: none  Re-evaluation due 10/18  Manual      SC jt mobs AP and inferior x NP      L shoulder PROM to jayda x x x x                                Exercise Diary      HEP instruction pendulums, scap retraction       Pulleys  10" x10 Flex/Scap 10" x10 Flex/Scap 10" x10 Flex/Scap    Finger ladder  10" x10 Flex/Scap 10" x10 Flex/Scap 10"x10  Flex/scap    UT stretch  L side only 30" x3 L side only 30" x3 L side only 30" x3    Levator stretch  L side only 30" x3 L side only 30" x3 L side only 30" x3    Shoulder isometrics - all dir   5" x10 Flex/ER/IR 5" x10 Flex/ER/IR    Standing AAROM shoulder scaption w/cane        Standing scaption w/therapist  assist scap upward rotation        Pec stretch  30" x3 30" x3  30" x3     Rhomboid stretch  30" x3 ea 30" x3 ea 30" x3  ea    UBE    120 RPM 5' retro  120 RPM 5' retro     MTP/LTP   Red TB 5" 2x10 Red TB 5" 2x10                                                                        Modalities      Stim/CP post tx to L shoulder  NP

## 2019-09-30 ENCOUNTER — OFFICE VISIT (OUTPATIENT)
Dept: PHYSICAL THERAPY | Facility: CLINIC | Age: 39
End: 2019-09-30
Payer: OTHER MISCELLANEOUS

## 2019-09-30 DIAGNOSIS — M25.512 ACUTE PAIN OF LEFT SHOULDER: Primary | ICD-10-CM

## 2019-09-30 DIAGNOSIS — S49.92XD ACROMIOCLAVICULAR JOINT INJURY, LEFT, SUBSEQUENT ENCOUNTER: ICD-10-CM

## 2019-09-30 PROCEDURE — 97140 MANUAL THERAPY 1/> REGIONS: CPT

## 2019-09-30 PROCEDURE — 97110 THERAPEUTIC EXERCISES: CPT

## 2019-09-30 NOTE — PROGRESS NOTES
Daily Note     Today's date: 2019  Patient name: Dulce Gurrola  : 1980  MRN: [id number]  Referring provider: Inessa Anders MD  Dx:   Encounter Diagnosis     ICD-10-CM    1  Acute pain of left shoulder M25 512    2  Acromioclavicular joint injury, left, subsequent encounter S49  92XD                   Subjective: "I still have some minor pain when I have to move my arm quickly or I forget about my shoulder and use it to much "      Objective: See treatment diary below      Assessment: Tolerated treatment well  Increased resistance on UBE and during all TB exercises  Patient demonstrated fatigue post treatment and would benefit from continued PT      Plan: Continue per plan of care        Precautions: none  Re-evaluation due 10/18  Manual     SC jt mobs AP and inferior x NP      L shoulder PROM to jayda x x x x x                               Exercise Diary     HEP instruction pendulums, scap retraction       Pulleys  10" x10 Flex/Scap 10" x10 Flex/Scap 10" x10 Flex/Scap 10" x10 Flex/Scap   Finger ladder  10" x10 Flex/Scap 10" x10 Flex/Scap 10"x10  Flex/scap 10" x10 Flex/Scap   UT stretch  L side only 30" x3 L side only 30" x3 L side only 30" x3 L side only 30" x3   Levator stretch  L side only 30" x3 L side only 30" x3 L side only 30" x3 L side only 30" x3   Shoulder isometrics - all dir   5" x10 Flex/ER/IR 5" x10 Flex/ER/IR 5" x10 Flex/ER/IR   Standing AAROM shoulder scaption w/cane        Standing scaption w/therapist  assist scap upward rotation        Pec stretch  30" x3 30" x3  30" x3  30" x3  ea   Rhomboid stretch  30" x3 ea 30" x3 ea 30" x3  ea 30" x3  ea   UBE    120 RPM 5' retro  120 RPM 5' retro  100 RPM 8' retro    MTP/LTP   Red TB 5" 2x10 Red TB 5" 2x10 Green TB 5" 2x10                                                                       Modalities     Stim/CP post tx to L shoulder  NP   NP

## 2019-10-02 ENCOUNTER — OFFICE VISIT (OUTPATIENT)
Dept: PHYSICAL THERAPY | Facility: CLINIC | Age: 39
End: 2019-10-02
Payer: OTHER MISCELLANEOUS

## 2019-10-02 DIAGNOSIS — S49.92XD ACROMIOCLAVICULAR JOINT INJURY, LEFT, SUBSEQUENT ENCOUNTER: ICD-10-CM

## 2019-10-02 DIAGNOSIS — M25.512 ACUTE PAIN OF LEFT SHOULDER: Primary | ICD-10-CM

## 2019-10-02 PROCEDURE — 97140 MANUAL THERAPY 1/> REGIONS: CPT

## 2019-10-02 PROCEDURE — 97110 THERAPEUTIC EXERCISES: CPT

## 2019-10-02 NOTE — PROGRESS NOTES
Daily Note     Today's date: 10/2/2019  Patient name: Al Alfonso  : 1980  MRN: [id number]  Referring provider: Blas Darden MD  Dx:   Encounter Diagnosis     ICD-10-CM    1  Acute pain of left shoulder M25 512    2  Acromioclavicular joint injury, left, subsequent encounter S49  92XD                   Subjective: "I am sore after I am done my therapy and when I am done work  I started to have a pain in the back of my shoulder  It is sensitive to the touch "      Objective: See treatment diary below      Assessment: Tolerated treatment well  Initiated multiple AROM exercises to strengthen the L shoulder  No exacerbation of symptoms this date  Patient demonstrated fatigue post treatment and would benefit from continued PT        Plan: Continue per plan of care        Precautions: none  Re-evaluation due 10/18  Manual  10/2 9/23 9/25 9/27 9/30   SC jt mobs AP and inferior x NP      L shoulder PROM to jayda x x x x x                               Exercise Diary  10/2 9/23 9/25 9/27 9/30   HEP instruction        Pulleys 10" x10 Flex/Scap 10" x10 Flex/Scap 10" x10 Flex/Scap 10" x10 Flex/Scap 10" x10 Flex/Scap   Finger ladder 10" x10 Flex/Scap 10" x10 Flex/Scap 10" x10 Flex/Scap 10"x10  Flex/scap 10" x10 Flex/Scap   UT stretch DC L side only 30" x3 L side only 30" x3 L side only 30" x3 L side only 30" x3   Levator stretch DC L side only 30" x3 L side only 30" x3 L side only 30" x3 L side only 30" x3   Shoulder isometrics - all dir 5" x10 Flex/ER/IR  5" x10 Flex/ER/IR 5" x10 Flex/ER/IR 5" x10 Flex/ER/IR   Standing AAROM shoulder scaption w/cane        Standing scaption w/therapist  assist scap upward rotation        Pec stretch 30" x3  ea 30" x3 30" x3  30" x3  30" x3  ea   Rhomboid stretch 30" x3  ea 30" x3 ea 30" x3 ea 30" x3  ea 30" x3  ea   UBE  100 RPM 8' retro  120 RPM 5' retro  120 RPM 5' retro  100 RPM 8' retro    MTP/LTP Green TB 5" 2x10  Red TB 5" 2x10 Red TB 5" 2x10 Green TB 5" 2x10   AROM Flex to 90* 3" 2x10       AROM Scap to 90* 3" 2x10       Prone Flex 2x10       Prone HRZ ABD 2x10       Prone Ext 2x10                                   Modalities  10/2 9/23 9/25 9/27 9/30   Stim/CP post tx to L shoulder NP NP   NP

## 2019-10-04 ENCOUNTER — OFFICE VISIT (OUTPATIENT)
Dept: PHYSICAL THERAPY | Facility: CLINIC | Age: 39
End: 2019-10-04
Payer: OTHER MISCELLANEOUS

## 2019-10-04 DIAGNOSIS — M25.512 ACUTE PAIN OF LEFT SHOULDER: Primary | ICD-10-CM

## 2019-10-04 DIAGNOSIS — S49.92XD ACROMIOCLAVICULAR JOINT INJURY, LEFT, SUBSEQUENT ENCOUNTER: ICD-10-CM

## 2019-10-04 PROCEDURE — 97140 MANUAL THERAPY 1/> REGIONS: CPT

## 2019-10-04 PROCEDURE — 97110 THERAPEUTIC EXERCISES: CPT

## 2019-10-04 PROCEDURE — 97535 SELF CARE MNGMENT TRAINING: CPT

## 2019-10-04 NOTE — PROGRESS NOTES
Daily Note     Today's date: 10/4/2019  Patient name: Pam Saunders  : 1980  MRN: [id number]  Referring provider: Marla Singer MD  Dx:   Encounter Diagnosis     ICD-10-CM    1  Acute pain of left shoulder M25 512    2  Acromioclavicular joint injury, left, subsequent encounter S49  92XD                   Subjective: "I feel okay today  I still am having mehreen pain in the back of my shoulder "      Objective: See treatment diary below      Assessment: Tolerated treatment well  Updated HEP  Progressed to TB resistance during IR/ER of the L shoulder  Patient demonstrated fatigue post treatment and would benefit from continued PT      Plan: Continue per plan of care        Precautions: none  Re-evaluation due 10/18  Manual  10/2 10/4 9/25 9/27 9/30   SC jt mobs AP and inferior        L shoulder PROM to jayda x x x x x                               Exercise Diary  10/2 10/4 9/25 9/27 9/30   HEP instruction  Updated      Pulleys 10" x10 Flex/Scap 10" x10 Flex/Scap 10" x10 Flex/Scap 10" x10 Flex/Scap 10" x10 Flex/Scap   Finger ladder 10" x10 Flex/Scap 10" x10 Flex/Scap 10" x10 Flex/Scap 10"x10  Flex/scap 10" x10 Flex/Scap   UT stretch DC DC to HEP L side only 30" x3 L side only 30" x3 L side only 30" x3   Levator stretch DC DC to HEP L side only 30" x3 L side only 30" x3 L side only 30" x3   Shoulder isometrics - all dir 5" x10 Flex/ER/IR Red TB IR/ER x20 ea 5" x10 Flex/ER/IR 5" x10 Flex/ER/IR 5" x10 Flex/ER/IR   Standing AAROM shoulder scaption w/cane        Standing scaption w/therapist  assist scap upward rotation        Pec stretch 30" x3  ea DC to HEP 30" x3  30" x3  30" x3  ea   Rhomboid stretch 30" x3  ea DC to HEP 30" x3 ea 30" x3  ea 30" x3  ea   UBE  100 RPM 8' retro 100 RPM 10' retro 120 RPM 5' retro  120 RPM 5' retro  100 RPM 8' retro    MTP/LTP Green TB 5" 2x10 Green TB 5" 2x10 Red TB 5" 2x10 Red TB 5" 2x10 Green TB 5" 2x10   AROM Flex to 90* 3" 2x10 3" 2x10      AROM Scap to 90* 3" 2x10 3" 2x10 Prone Flex 2x10 2x10      Prone HRZ ABD 2x10 2x10      Prone Ext 2x10 2x10                                  Modalities  10/2 10/4 9/25 9/27 9/30   Stim/CP post tx to L shoulder NP NP   NP

## 2019-10-07 ENCOUNTER — OFFICE VISIT (OUTPATIENT)
Dept: PHYSICAL THERAPY | Facility: CLINIC | Age: 39
End: 2019-10-07
Payer: OTHER MISCELLANEOUS

## 2019-10-07 DIAGNOSIS — S49.92XD ACROMIOCLAVICULAR JOINT INJURY, LEFT, SUBSEQUENT ENCOUNTER: ICD-10-CM

## 2019-10-07 DIAGNOSIS — M25.512 ACUTE PAIN OF LEFT SHOULDER: Primary | ICD-10-CM

## 2019-10-07 PROCEDURE — 97140 MANUAL THERAPY 1/> REGIONS: CPT

## 2019-10-07 PROCEDURE — 97110 THERAPEUTIC EXERCISES: CPT

## 2019-10-07 NOTE — PROGRESS NOTES
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  I was pulling them back from a shelf at shoulder height  I was sore after that  I felt very weak "      Objective: See treatment diary below      Assessment: Tolerated treatment well  Progressed to weight during multiple exercises to increase strength of the L shoulder  Patient demonstrated fatigue post treatment and would benefit from continued PT      Plan: Continue per plan of care        Precautions: none  Re-evaluation due 10/18  Manual  10/2 10/4 10/7 9/27 9/30   SC jt mobs AP and inferior        L shoulder PROM to jayda x x x x x                               Exercise Diary  10/2 10/4 10/7 9/27 9/30   HEP instruction  Updated      Pulleys 10" x10 Flex/Scap 10" x10 Flex/Scap 10" x10 Flex/Scap 10" x10 Flex/Scap 10" x10 Flex/Scap   Finger ladder 10" x10 Flex/Scap 10" x10 Flex/Scap 10" x10 Flex/Scap 10"x10  Flex/scap 10" x10 Flex/Scap   UT stretch DC DC to HEP -- L side only 30" x3 L side only 30" x3   Levator stretch DC DC to HEP -- L side only 30" x3 L side only 30" x3   Shoulder isometrics - all dir 5" x10 Flex/ER/IR Red TB IR/ER x20 ea Red TB IR/ER x20 ea 5" x10 Flex/ER/IR 5" x10 Flex/ER/IR   Standing AAROM shoulder scaption w/cane        Standing scaption w/therapist  assist scap upward rotation        Pec stretch 30" x3  ea DC to HEP -- 30" x3  30" x3  ea   Rhomboid stretch 30" x3  ea DC to HEP -- 30" x3  ea 30" x3  ea   UBE  100 RPM 8' retro 100 RPM 10' retro 100 RPM 10' retro  120 RPM 5' retro  100 RPM 8' retro    MTP/LTP Green TB 5" 2x10 Green TB 5" 2x10 Blue TB 5" 2x10 Red TB 5" 2x10 Green TB 5" 2x10   AROM Flex to 90* 3" 2x10 3" 2x10 3" 2x10 1#     AROM Scap to 90* 3" 2x10 3" 2x10 3" 2x10 1#     Prone Flex 2x10 2x10 2x10 1#     Prone HRZ ABD 2x10 2x10 2x10 1#     Prone Ext 2x10 2x10 2x10 1#                                 Modalities  10/2 10/4 10/7 9/27 9/30   Stim/CP post tx to L shoulder NP NP --  NP

## 2019-10-09 ENCOUNTER — OFFICE VISIT (OUTPATIENT)
Dept: PHYSICAL THERAPY | Facility: CLINIC | Age: 39
End: 2019-10-09
Payer: OTHER MISCELLANEOUS

## 2019-10-09 DIAGNOSIS — M25.512 ACUTE PAIN OF LEFT SHOULDER: Primary | ICD-10-CM

## 2019-10-09 DIAGNOSIS — S49.92XD ACROMIOCLAVICULAR JOINT INJURY, LEFT, SUBSEQUENT ENCOUNTER: ICD-10-CM

## 2019-10-09 PROCEDURE — 97140 MANUAL THERAPY 1/> REGIONS: CPT

## 2019-10-09 PROCEDURE — 97110 THERAPEUTIC EXERCISES: CPT

## 2019-10-11 ENCOUNTER — OFFICE VISIT (OUTPATIENT)
Dept: PHYSICAL THERAPY | Facility: CLINIC | Age: 39
End: 2019-10-11
Payer: OTHER MISCELLANEOUS

## 2019-10-11 DIAGNOSIS — M25.512 ACUTE PAIN OF LEFT SHOULDER: Primary | ICD-10-CM

## 2019-10-11 DIAGNOSIS — S49.92XD ACROMIOCLAVICULAR JOINT INJURY, LEFT, SUBSEQUENT ENCOUNTER: ICD-10-CM

## 2019-10-11 PROCEDURE — 97140 MANUAL THERAPY 1/> REGIONS: CPT

## 2019-10-11 PROCEDURE — 97110 THERAPEUTIC EXERCISES: CPT

## 2019-10-14 ENCOUNTER — EVALUATION (OUTPATIENT)
Dept: PHYSICAL THERAPY | Facility: CLINIC | Age: 39
End: 2019-10-14
Payer: OTHER MISCELLANEOUS

## 2019-10-14 VITALS — DIASTOLIC BLOOD PRESSURE: 88 MMHG | SYSTOLIC BLOOD PRESSURE: 120 MMHG

## 2019-10-14 DIAGNOSIS — M25.512 ACUTE PAIN OF LEFT SHOULDER: Primary | ICD-10-CM

## 2019-10-14 DIAGNOSIS — S49.92XD ACROMIOCLAVICULAR JOINT INJURY, LEFT, SUBSEQUENT ENCOUNTER: ICD-10-CM

## 2019-10-14 PROCEDURE — 97110 THERAPEUTIC EXERCISES: CPT | Performed by: PHYSICAL THERAPIST

## 2019-10-14 PROCEDURE — 97164 PT RE-EVAL EST PLAN CARE: CPT | Performed by: PHYSICAL THERAPIST

## 2019-10-14 PROCEDURE — 97140 MANUAL THERAPY 1/> REGIONS: CPT | Performed by: PHYSICAL THERAPIST

## 2019-10-14 NOTE — PROGRESS NOTES
Daily Note     Today's date: 10/14/2019  Patient name: Inocente Olsen  : 1980  MRN: [id number]  Referring provider: Hari Wilcox MD  Dx:   Encounter Diagnosis     ICD-10-CM    1  Acute pain of left shoulder M25 512    2  Acromioclavicular joint injury, left, subsequent encounter S49  92XD                   Assessment  Inocente Olsen has been compliant with PT services  He has attended a total of 11 visits since [facility] 4 weeks ago  He has demonstrated decreased pain, increased strength, increased range of motion, and increased activity tolerance since starting physical therapy services  He is responding well to PT and reports an overall improvement of 50% thus far  He continues to present with pain, decreased strength, decreased range of motion, and decreased activity tolerance and would benefit from additional skilled physical therapy interventions to address impairments and maximize function        Impairments: abnormal or restricted ROM, activity intolerance, impaired physical strength, pain with function and scapular dyskinesis  Functional limitations: restricted lifting <10 lbs, pain with behind the back and OHA, Understanding of Dx/Px/POC: good   Prognosis: good    Goals  1) In 4 weeks patient will report reduced pain 50% or greater - partially met   2) In 4 weeks patient will demonstrate L shoulder AROM WFL all directions - partially met  3) In 4 weeks patient will demonstrate improved scapular mobility with good control during scaption-partially met  4) In 4 weeks patient will demonstrate independence with HEP-met  5) In 4 weeks patient will demonstrate readiness for progression to strengthening with > 10 lbs -not met    Plan  Patient would benefit from: skilled physical therapy  Referral necessary: No  Planned modality interventions: cryotherapy, TENS, unattended electrical stimulation and thermotherapy: hydrocollator packs  Planned therapy interventions: joint mobilization, manual therapy, massage, Maloney taping, home exercise program, functional ROM exercises, flexibility, therapeutic exercise, stretching, strengthening, patient education, self care and postural training  Frequency: 3x week  Duration in weeks: 4  Plan of Care beginning date: 10/14/2019  Plan of Care expiration date: 11/11/2019  Treatment plan discussed with: patient        Subjective Evaluation    History of Present Illness  Date of onset: 8/23/2019  Mechanism of injury: Patient presents to PT after work related injury  Patient is employed as a  at [employer]  Reports injury on 8/23/19 to L shoulder when attempting to lift a 10 lb box from about waist height and felt an immediate sharp pain in the L shoulder  He stopped working at that moment, reported to his manager, went on lunch and the pain worsened  Saw WC doc that same day  Had xrays but no MRI at this time  Patient saw ortho doc (referring Dr sIabel Simon) on Tues 9/17 and received steroid injection which provided much relief from pain on the day of and day after the injection  Reports the "grinding" is also less since the injection  Pain is primarily L shoulder superior and anterior  Occasionally feels "sore" into L elbow and forearm  Reports intermittent "numbness" along lateral aspect of forearm if he rests arm on pillow  Pain overall has gradually improved since the injury occurred  He uses a sling when out in public to avoid using his arm  RA 10/14:  Patient reports he is feeling better since he started PT  He reports compliance with lifting restrictions at home and at work, however he still has pain with repetitive use of L UE, WB onto L UE and with reaching/pushing/pulling  Nolvia has pain with quick sudden movements  He feels about 50% improved since his SOC  Reports that he no longer experiences the intermittent "numbness" along lateral aspect of forearm  Still using his sling when out in public and when at work not having to use L UE         Pain  IE    RA 10/14:  Current pain ratin  3  At best pain ratin  0  At worst pain ratin  7  Quality: dull ache, grinding and sharp    Relieving factors: ice and medications  RA 10/14: takes Aleve or Tylenol prn (reports taking this about every other day), uses ice daily    Aggravating factors: overhead activity and lifting  RA 10/14: patient has been compliant with not lifting >10 lbs, pain increases with WB/leaning onto L UE and when using L UE for prolonged periods (hand held computer , etc) , pain with lowering L UE from elevated position and with reaching forward, pushing/pulling    Social Support  Lives in: [facility]  Lives with: spouse and young children    Employment status: not working (employed as a  at [employer], currently injured)  Hand dominance: left  Exercise history: daily walks    Treatments  Current treatment: injection treatment and medication  Patient Goals  Patient goals for therapy: return to work and decreased pain          Objective     Tenderness     Left Shoulder   Tenderness in the [facility] joint, acromion, clavicle and coracoid process  No tenderness in the biceps tendon (proximal), bicipital groove, infraspinatus tendon, lateral scapula, medial scapula, scapular spine, SC joint, subscapularis tendon and supraspinatus tendon  RA 10/14: (+) ttp persists on L AC jt, infraspinatus and supraspinatus tendons    Active Range of Motion    Cervical  Flexion:  WFL  Extension:  WFL  Left lateral flexion:  WFL  Right lateral flexion:  WFL  Left rotation:  WFL  Right rotation:  [facility]      RA 10/14  Left Shoulder   Flexion: 110 degrees with pain  135 deg with pain  Extension: 30 degrees    65 deg  Abduction: 60 degrees with pain  60 deg (pain in AC jt), can lift to 150* with pain  External rotation BTH: T1 with pain  T6  Internal rotation BTB: L2 with pain  L1 with pain    Additional Active Range of Motion Details  Pain reproduced with all AROM   Extension, IR, ER >> abduction and flexion  RA 10/14: pain reproduced with AROM flexion, abduction and IR    Passive Range of Motion   IE      RA 10/14  Left Shoulder   Flexion: [facility] pain free  Abduction: 50 degrees with pain  150 deg pain free  External rotation 90°: 65 degrees   90+ deg pain free  Internal rotation 90°: 40 degrees with pain 50 deg pain free    Scapular Mobility   Left Shoulder   Scapular Dyskinesis: grade I  Scapular mobility: good  Scapular Mobility with Shoulder to 90° FF   Scapular winging: minimal  Upward rotation: inadequate  Downward rotation: inadequate    Scapular Mobility beyond 90° FF   Scapular winging: minimal  Upward rotation: inadequate  Downward rotation: inadequate    Strength/Myotome Testing   IE      RA 10/14  Left Shoulder     Planes of Motion   Flexion: 4-      4+  Extension: 4-      4+  Abduction: 4-      4- with pain  External rotation at 0°: 4-    4+  External rotation at 90°: 4-    4+    Additional Strength Details  MMT tested with shoulder shoulder neutral (arm by side)    Tests     Left Shoulder   Positive anterior load and shift, scapular relocation  and scapular assistance test positive  Negative belly press, clunk, crank, Hawkin's, Neer's, painful arc, passive horizontal adduction and bicep load      RA 10/14: (-) anterior load and shift, (+) scapular relocation, (+) scapular assistance test      Flowsheet Rows      Most Recent Value   PT/OT G-Codes   Current Score  71 (initial evaluation = 41)   Projected Score  64             Precautions: none  Re-evaluation due 11/11  Manual  10/14 10/4 10/7 10/9 10/11   SC jt mobs AP and inferior        L shoulder PROM to jayda Resume NV x x x x   L shoulder standing MWM scaption x10                           Exercise Diary  10/14 10/4 10/7 10/9 10/11   HEP instruction  Updated      Pulleys Resume NV 10" x10 Flex/Scap 10" x10 Flex/Scap 10" x10 Flex/Scap 10" x10 Flex/Scap   Finger ladder Resume NV 10" x10 Flex/Scap 10" x10 Flex/Scap 10"x10  Flex/scap 10" x10 Flex/Scap   TB IR/ER Resume NV Red TB IR/ER x20 ea Red TB IR/ER x20 ea Red TB IR/ER x20 ea Red TB IR/ER x20 ea   Standing AAROM shoulder scaption w/cane NV       Standing scaption w/therapist  assist scap upward rotation NV       UBE  Resume NV, trial 5' fwd/5' retro 100 RPM 10' retro 100 RPM 10' retro  100 RPM 10' retro  100 RPM 10' retro    MTP/LTP Resume NV Green TB 5" 2x10 Blue TB 5" 2x10 Blue TB 5" 2x10 Blue TB 5" 2x10   AROM Flex to 90* Resume NV 3" 2x10 3" 2x10 1# 3" 2x10 1# 3" 2x10 1#   AROM Scap to 90* Resume NV 3" 2x10 3" 2x10 1# 3" 2x10 1# 3" 2x10 1#   Prone Flex Resume NV 2x10 2x10 1# 2x10 1# 2x10 1#   Prone HRZ ABD Resume NV 2x10 2x10 1# 2x10 1# 2x10 1#   Prone Ext Resume NV 2x10 2x10 1# 2x10 1# 2x10 1#   Supine rhythmic stabilizations at 90 deg flex 30" x4       Supine stabilization flex 70-90 deg deg, abd  deg 1# 20x ea       Supine scap punches 1# 5" x20       Supine scap ABCs 1# x1           Modalities  10/14 10/4 10/7 10/9 10/11   Stim/CP post tx to L shoulder NP NP --  NP

## 2019-10-15 ENCOUNTER — OFFICE VISIT (OUTPATIENT)
Dept: OBGYN CLINIC | Facility: CLINIC | Age: 39
End: 2019-10-15
Payer: OTHER MISCELLANEOUS

## 2019-10-15 VITALS
BODY MASS INDEX: 26.06 KG/M2 | WEIGHT: 166 LBS | HEART RATE: 67 BPM | SYSTOLIC BLOOD PRESSURE: 155 MMHG | HEIGHT: 67 IN | DIASTOLIC BLOOD PRESSURE: 85 MMHG

## 2019-10-15 DIAGNOSIS — S49.92XD INJURY OF LEFT ACROMIOCLAVICULAR JOINT, SUBSEQUENT ENCOUNTER: Primary | ICD-10-CM

## 2019-10-15 DIAGNOSIS — M75.42 IMPINGEMENT SYNDROME OF LEFT SHOULDER: ICD-10-CM

## 2019-10-15 PROCEDURE — 99213 OFFICE O/P EST LOW 20 MIN: CPT | Performed by: ORTHOPAEDIC SURGERY

## 2019-10-15 NOTE — PROGRESS NOTES
Chief Complaint   left shoulder pain    History Of Presenting Illness  Sammy Talley 1980 presents with  Left shoulder pain mainly in the superior aspect aggravated by overhead activities and activities across the chest   Good temporary relief with steroid injection  Symptoms are now annoying him to the point where he wants something done for this      Current Medications  Current Outpatient Medications   Medication Sig Dispense Refill    ibuprofen (MOTRIN) 800 mg tablet Take by mouth every 8 (eight) hours as needed for mild pain      oxyCODONE-acetaminophen (PERCOCET) 5-325 mg per tablet Take as directed at home (Patient not taking: Reported on 9/17/2019)  0    oxyCODONE-acetaminophen (PERCOCET) 5-325 mg per tablet Take as directed at home (Patient not taking: Reported on 9/17/2019)  0    predniSONE 20 mg tablet Take as directed at home (Patient not taking: Reported on 9/17/2019)  0    predniSONE 20 mg tablet Take as directed at home (Patient not taking: Reported on 9/17/2019)  0     No current facility-administered medications for this visit  Current Problems    Active Problems: There are no active problems to display for this patient          Review of Systems:    General: negative for - chills, fatigue, fever,  weight gain or weight loss  Psychological: negative for - anxiety, behavioral disorder, concentration difficulties  Ophthalmic: negative for - blurry vision, decreased vision, double vision,      Past Medical History:   Past Medical History:   Diagnosis Date    DDD (degenerative disc disease), cervical     Neck pain     Occasional    PONV (postoperative nausea and vomiting)     Seasonal allergies     Wears glasses        Past Surgical History:   Past Surgical History:   Procedure Laterality Date    CONTROL BLEED POST TONSILLECTOMY  10/31/2017    Procedure: CONTROL BLEED POST TONSILLECTOMY;  Surgeon: Pippa Pacheco DO;  Location: [facility];  Service: ENT    HEMORRHOID SURGERY x2    NH REMOVAL OF TONSILS,12+ Y/O N/A 10/31/2017    Procedure: TONSILLECTOMY;  Surgeon: Verner Labor, DO;  Location: [facility];  Service: ENT    SHOULDER SURGERY Right     x2- decompression    WISDOM TOOTH EXTRACTION         Family History:  Family history reviewed and non-contributory  Family History   Problem Relation Age of Onset    Graves' disease Mother     Hypertension Father     Cancer Paternal Uncle        Social History:  Social History     Socioeconomic History    Marital status: /Civil Union     Spouse name: None    Number of children: None    Years of education: None    Highest education level: None   Occupational History    None   Social Needs    Financial resource strain: None    Food insecurity:     Worry: None     Inability: None    Transportation needs:     Medical: None     Non-medical: None   Tobacco Use    Smoking status: Former Smoker     Packs/day:      Years:      Pack years: 12      Types: Cigarettes     Last attempt to quit:      Years since quittin 7    Smokeless tobacco: Never Used   Substance and Sexual Activity    Alcohol use: Yes     Comment: 1 weekly    Drug use: No    Sexual activity: None   Lifestyle    Physical activity:     Days per week: None     Minutes per session: None    Stress: None   Relationships    Social connections:     Talks on phone: None     Gets together: None     Attends Taoism service: None     Active member of club or organization: None     Attends meetings of clubs or organizations: None     Relationship status: None    Intimate partner violence:     Fear of current or ex partner: None     Emotionally abused: None     Physically abused: None     Forced sexual activity: None   Other Topics Concern    None   Social History Narrative    None       Allergies:   No Known Allergies        Physical ExaminationBP 155/85   Pulse 67   Ht 5' 7" (1 702 m)   Wt 75 3 kg (166 lb)   BMI 26 00 kg/m²   Gen: Alert and oriented to person, place, time  HEENT: EOMI, eyes clear, moist mucus membranes, hearing intact      Orthopedic Exam   left shoulder mild swelling present of the [facility] joint with tenderness   palpation and adduction reproduces symptoms   Cuff strength 4+ out of 5 signs of impingement positive          Impression   left shoulder pain due to [facility] joint arthritis and impingement        Plan     discussed treatment with the patient   continue physical therapy   patient will return with an MRI of the left shoulder    Janey Dent MD        Portions of the record may have been created with voice recognition software  Occasional wrong word or "sound a like" substitutions may have occurred due to the inherent limitations of voice recognition software  Read the chart carefully and recognize, using context, where substitutions have occurred

## 2019-10-17 ENCOUNTER — OFFICE VISIT (OUTPATIENT)
Dept: PHYSICAL THERAPY | Facility: CLINIC | Age: 39
End: 2019-10-17
Payer: OTHER MISCELLANEOUS

## 2019-10-17 DIAGNOSIS — M25.512 ACUTE PAIN OF LEFT SHOULDER: Primary | ICD-10-CM

## 2019-10-17 DIAGNOSIS — S49.92XD ACROMIOCLAVICULAR JOINT INJURY, LEFT, SUBSEQUENT ENCOUNTER: ICD-10-CM

## 2019-10-17 PROCEDURE — 97110 THERAPEUTIC EXERCISES: CPT

## 2019-10-17 NOTE — PROGRESS NOTES
Daily Note     Today's date: 10/17/2019  Patient name: Oleg Esquivel  : 1980  MRN: [id number]  Referring provider: Aniceto Gonzalez MD  Dx:   Encounter Diagnosis     ICD-10-CM    1  Acute pain of left shoulder M25 512    2  Acromioclavicular joint injury, left, subsequent encounter S49  92XD                   Subjective: "I have an MRI scheduled for Monday  The Ortho believes that I developed bursitis in the L shoulder "      Objective: See treatment diary below      Assessment: Tolerated treatment well  PROM WNL, held manuals this date  Initiated AAROM in scaption to increase scap upward rotation  Patient demonstrated fatigue post treatment and would benefit from continued PT        Plan: Continue per plan of care        Precautions: none  Re-evaluation due   Manual  10/14 10/17 10/7 10/9 10/11   SC jt mobs AP and inferior        L shoulder PROM to jayda Resume NV NP this date x x x   L shoulder standing MWM scaption x10                           Exercise Diary  10/14 10/17 10/7 10/9 10/11   HEP instruction        Pulleys Resume NV 10" x10 Flex/Scap 10" x10 Flex/Scap 10" x10 Flex/Scap 10" x10 Flex/Scap   Finger ladder Resume NV 10" x10 Flex/Scap 10" x10 Flex/Scap 10"x10  Flex/scap 10" x10 Flex/Scap   TB IR/ER Resume NV Red TB IR/ER x20 ea Red TB IR/ER x20 ea Red TB IR/ER x20 ea Red TB IR/ER x20 ea   Standing AAROM shoulder scaption w/cane NV 5" 2x10      Standing scaption w/therapist  assist scap upward rotation NV NV      UBE  Resume NV, trial 5' fwd/5' retro 100 RPM 10' retro 100 RPM 10' retro  100 RPM 10' retro  100 RPM 10' retro    MTP/LTP Resume NV Blue TB 5" 2x10 Blue TB 5" 2x10 Blue TB 5" 2x10 Blue TB 5" 2x10   AROM Flex to 90* Resume NV 1# 3" 2x10 3" 2x10 1# 3" 2x10 1# 3" 2x10 1#   AROM Scap to 90* Resume NV 1# 3" 2x10 3" 2x10 1# 3" 2x10 1# 3" 2x10 1#   Prone Flex Resume NV 1# 2x10 2x10 1# 2x10 1# 2x10 1#   Prone HRZ ABD Resume NV 1# 2x10 2x10 1# 2x10 1# 2x10 1#   Prone Ext Resume NV 1# 2x10 2x10 1# 2x10 1# 2x10 1#   Supine rhythmic stabilizations at 90 deg flex 30" x4 30" x4      Supine stabilization flex 70-90 deg deg, abd  deg 1# 20x ea 1# 20x ea      Supine scap punches 1# 5" x20 1# 5" x20      Supine scap ABCs 1# x1 1# x1          Modalities  10/14 10/17 10/7 10/9 10/11   Stim/CP post tx to L shoulder NP NP --  NP

## 2019-10-18 ENCOUNTER — OFFICE VISIT (OUTPATIENT)
Dept: PHYSICAL THERAPY | Facility: CLINIC | Age: 39
End: 2019-10-18
Payer: OTHER MISCELLANEOUS

## 2019-10-18 DIAGNOSIS — M25.512 ACUTE PAIN OF LEFT SHOULDER: Primary | ICD-10-CM

## 2019-10-18 DIAGNOSIS — S49.92XD ACROMIOCLAVICULAR JOINT INJURY, LEFT, SUBSEQUENT ENCOUNTER: ICD-10-CM

## 2019-10-18 PROCEDURE — 97110 THERAPEUTIC EXERCISES: CPT

## 2019-10-18 NOTE — PROGRESS NOTES
Daily Note     Today's date: 10/18/2019  Patient name: Nick Wade  : 1980  MRN: [id number]  Referring provider: Abby Oh MD  Dx:   Encounter Diagnosis     ICD-10-CM    1  Acute pain of left shoulder M25 512    2  Acromioclavicular joint injury, left, subsequent encounter S49  92XD                   Subjective: "I feel pretty good today  I do get a bit sore after therapy "      Objective: See treatment diary below      Assessment: Tolerated treatment well  Progressed TB resistance and DB weight to strengthen the L shoulder  No exacerbation of L shoulder pain this date  Patient demonstrated fatigue post treatment and would benefit from continued PT      Plan: Continue per plan of care        Precautions: none  Re-evaluation due   Manual  10/14 10/17 10/18 10/9 10/11   SC jt mobs AP and inferior        L shoulder PROM to jayda Resume NV NP this date  x x   L shoulder standing MWM scaption x10                           Exercise Diary  10/14 10/17 10/18 10/9 10/11   HEP instruction        Pulleys Resume NV 10" x10 Flex/Scap 10" x10 Flex/Scap 10" x10 Flex/Scap 10" x10 Flex/Scap   Finger ladder Resume NV 10" x10 Flex/Scap 10" x10 Flex/Scap 10"x10  Flex/scap 10" x10 Flex/Scap   TB IR/ER Resume NV Red TB IR/ER x20 ea Green TB IR/ER x20 ea Red TB IR/ER x20 ea Red TB IR/ER x20 ea   Standing AAROM shoulder scaption w/cane NV 5" 2x10 5" 2x10     Standing scaption w/therapist  assist scap upward rotation NV NV      UBE  Resume NV, trial 5' fwd/5' retro 100 RPM 10' retro 100 RPM 5' fwd/5' retro  100 RPM 10' retro  100 RPM 10' retro    MTP/LTP Resume NV Blue TB 5" 2x10 Blue TB 5" 2x10 Blue TB 5" 2x10 Blue TB 5" 2x10   AROM Flex to 90* Resume NV 1# 3" 2x10 3" 2x10 2# 3" 2x10 1# 3" 2x10 1#   AROM Scap to 90* Resume NV 1# 3" 2x10 3" 2x10 2# 3" 2x10 1# 3" 2x10 1#   Prone Flex Resume NV 1# 2x10 2x10 2# 2x10 1# 2x10 1#   Prone HRZ ABD Resume NV 1# 2x10 2x10 2# 2x10 1# 2x10 1#   Prone Ext Resume NV 1# 2x10 2x10 2# 2x10 1# 2x10 1#   Supine rhythmic stabilizations at 90 deg flex 30" x4 30" x4 30" x4     Supine stabilization flex 70-90 deg deg, abd  deg 1# 20x ea 1# 20x ea 2# 20x ea     Supine scap punches 1# 5" x20 1# 5" x20 2# 5" x20     Supine scap ABCs 1# x1 1# x1 2# x1         Modalities  10/14 10/17 10/18 10/9 10/11   Stim/CP post tx to L shoulder NP NP --  NP

## 2019-10-21 ENCOUNTER — HOSPITAL ENCOUNTER (OUTPATIENT)
Dept: MRI IMAGING | Facility: HOSPITAL | Age: 39
Discharge: HOME | End: 2019-10-21
Attending: ORTHOPAEDIC SURGERY
Payer: OTHER MISCELLANEOUS

## 2019-10-21 DIAGNOSIS — M75.42 IMPINGEMENT SYNDROME OF LEFT SHOULDER: ICD-10-CM

## 2019-10-21 DIAGNOSIS — S49.92XD INJURY OF LEFT ACROMIOCLAVICULAR JOINT, SUBSEQUENT ENCOUNTER: ICD-10-CM

## 2019-10-21 PROCEDURE — 73221 MRI JOINT UPR EXTREM W/O DYE: CPT

## 2019-10-23 ENCOUNTER — OFFICE VISIT (OUTPATIENT)
Dept: OBGYN CLINIC | Facility: CLINIC | Age: 39
End: 2019-10-23
Payer: OTHER MISCELLANEOUS

## 2019-10-23 ENCOUNTER — TELEPHONE (OUTPATIENT)
Dept: OBGYN CLINIC | Facility: HOSPITAL | Age: 39
End: 2019-10-23

## 2019-10-23 ENCOUNTER — OFFICE VISIT (OUTPATIENT)
Dept: PHYSICAL THERAPY | Facility: CLINIC | Age: 39
End: 2019-10-23
Payer: OTHER MISCELLANEOUS

## 2019-10-23 VITALS
BODY MASS INDEX: 25.74 KG/M2 | WEIGHT: 164 LBS | DIASTOLIC BLOOD PRESSURE: 87 MMHG | HEIGHT: 67 IN | HEART RATE: 64 BPM | SYSTOLIC BLOOD PRESSURE: 134 MMHG

## 2019-10-23 DIAGNOSIS — M25.512 ACUTE PAIN OF LEFT SHOULDER: Primary | ICD-10-CM

## 2019-10-23 DIAGNOSIS — S49.92XD ACROMIOCLAVICULAR JOINT INJURY, LEFT, SUBSEQUENT ENCOUNTER: ICD-10-CM

## 2019-10-23 DIAGNOSIS — S43.432D SLAP LESION, TYPE II, LEFT, SUBSEQUENT ENCOUNTER: Primary | ICD-10-CM

## 2019-10-23 PROCEDURE — 99213 OFFICE O/P EST LOW 20 MIN: CPT | Performed by: ORTHOPAEDIC SURGERY

## 2019-10-23 PROCEDURE — 97110 THERAPEUTIC EXERCISES: CPT

## 2019-10-23 RX ORDER — CHLORHEXIDINE GLUCONATE 4 G/100ML
SOLUTION TOPICAL DAILY PRN
Status: CANCELLED | OUTPATIENT
Start: 2019-10-23

## 2019-10-23 RX ORDER — CEFAZOLIN SODIUM 2 G/50ML
2000 SOLUTION INTRAVENOUS ONCE
Status: CANCELLED | OUTPATIENT
Start: 2019-10-31 | End: 2019-10-23

## 2019-10-23 NOTE — PROGRESS NOTES
Daily Note     Today's date: 10/23/2019  Patient name: Karan Connor  : 1980  MRN: [id number]  Referring provider: Howard Arce MD  Dx:   Encounter Diagnosis     ICD-10-CM    1  Acute pain of left shoulder M25 512    2  Acromioclavicular joint injury, left, subsequent encounter S49  92XD                   Subjective: "I had my MRI  The Ortho called me and wants me to come in today  I see him when I am done here " "My shoulder is getting stronger but the grinding feeling still bothers me "      Objective: See treatment diary below      Assessment: Tolerated treatment well  Initiated the Body Blade to increase dynamic stability of the shoulder  Patient demonstrated fatigue post treatment and would benefit from continued PT      Plan: Continue per plan of care        Precautions: none  Re-evaluation due   Manual  10/14 10/17 10/18 10/23 10/11   SC jt mobs AP and inferior        L shoulder PROM to jayda Resume NV NP this date   x   L shoulder standing MWM scaption x10                           Exercise Diary  10/14 10/17 10/18 10/23 10/11   HEP instruction        Pulleys Resume NV 10" x10 Flex/Scap 10" x10 Flex/Scap 10" x10 Flex/Scap 10" x10 Flex/Scap   Finger ladder Resume NV 10" x10 Flex/Scap 10" x10 Flex/Scap 10"x10  Flex/scap 10" x10 Flex/Scap   TB IR/ER Resume NV Red TB IR/ER x20 ea Green TB IR/ER x20 ea Green TB IR/ER x20 ea Red TB IR/ER x20 ea   Standing AAROM shoulder scaption w/cane NV 5" 2x10 5" 2x10     Standing scaption w/therapist  assist scap upward rotation NV NV      UBE  Resume NV, trial 5' fwd/5' retro 100 RPM 10' retro 100 RPM 5' fwd/5' retro  100 RPM 5' fwd/5' retro  100 RPM 10' retro    MTP/LTP Resume NV Blue TB 5" 2x10 Blue TB 5" 2x10 Blue TB 5" 2x10 Blue TB 5" 2x10   AROM Flex to 90* Resume NV 1# 3" 2x10 3" 2x10 2# 3" 2x10 2# 3" 2x10 1#   AROM Scap to 90* Resume NV 1# 3" 2x10 3" 2x10 2# 3" 2x10 2# 3" 2x10 1#   Prone Flex Resume NV 1# 2x10 2x10 2# 2x10 2# 2x10 1#   Prone HRZ ABD Resume NV 1# 2x10 2x10 2# 2x10 2# 2x10 1#   Prone Ext Resume NV 1# 2x10 2x10 2# 2x10 2# 2x10 1#   Supine rhythmic stabilizations at 90 deg flex 30" x4 30" x4 30" x4 30" x4    Supine stabilization flex 70-90 deg deg, abd  deg 1# 20x ea 1# 20x ea 2# 20x ea 2# 20x ea    Supine scap punches 1# 5" x20 1# 5" x20 2# 5" x20 2# 5" x20    Supine scap ABCs 1# x1 1# x1 2# x1 2# x1    Body Blade    15" x4 ea  3 ways        Modalities  10/14 10/17 10/18 10/23 10/11   Stim/CP post tx to L shoulder NP NP --  NP

## 2019-10-23 NOTE — PATIENT INSTRUCTIONS
Pre-Surgery Instructions:   Medication Instructions    ibuprofen (MOTRIN) 800 mg tablet Stop taking 3 days prior to surgery     Shoulder Arthroscopy   AMBULATORY CARE:   A shoulder arthroscopy  is a procedure to look inside your shoulder with an arthroscope  An arthroscope is a thin tube with a light and camera on the end  During a shoulder arthroscopy your healthcare provider may fix problems in your joint  Problems may include a torn rotator cuff, swollen tissue, or bone spurs  How to prepare for a shoulder arthroscopy:   · Your healthcare provider will talk to you about how to prepare for your procedure  You may need an x-ray, ultrasound, or MRI before your procedure  These tests will take pictures of your joint and help your healthcare provider plan for your surgery  Arrange for someone to drive you home and stay with you for at least 24 hours after the procedure  · Your healthcare provider may tell you not to eat or drink anything after midnight on the day of your procedure  He will tell you what medicines to take or not take on the day of your procedure  You may be given an antibiotic through your IV to help prevent a bacterial infection  What will happen during a shoulder arthroscopy:   · You may be given general anesthesia to keep you asleep and free from pain during surgery  You may instead be given local anesthesia to numb the surgery area  With local anesthesia, you may still feel pressure or pushing during surgery, but you should not feel any pain  Your healthcare provider will inject fluid into your shoulder  This will help him see inside your joint more clearly and prevent bleeding  · Your healthcare provider will make a small incision in your shoulder and insert the arthroscope  He may insert tools through 2 to 3 other small incisions in different places on your shoulder  The tools may be used to repair a torn rotator cuff, ligament, or dislocation   Tools may also be used to remove swollen tissue, cartilage, or a bone spur  Your healthcare provider may close your incisions with stitches or medical tape and cover them with a small bandage  What will happen after a shoulder arthroscopy:  Healthcare providers will monitor you until you are awake  You may need an x-ray to look at your shoulder joint and monitor for complications  Do not move your shoulder until your healthcare provider says it is okay  You may be given instructions on what movements to avoid  You may able to go home when your pain is controlled or you may need to spend a night in the hospital    Risks of a shoulder arthroscopy: You may bleed more than expected or get an infection  Nerves, ligaments, tendons, or blood vessels may be damaged during your procedure  You may get a blood clot in your arm or have trouble moving your shoulder  Call 911 for the following:   · You feel lightheaded, short of breath, and have chest pain  · You cough up blood  · You have trouble breathing  Seek care immediately if:   · Blood soaks through your bandage  · Your stitches come apart  · Your arm or fingers feel numb or look pale  · Your arm is larger than normal, red, and feels warm  · You cannot move your arm  Contact your healthcare provider if:   · You have a fever or chills  · Your wound is red, swollen, or draining pus  · You have nausea or are vomiting  · Your skin is itchy, swollen, or you have a rash  · You have questions or concerns about your condition or care  Medicines: You may need any of the following:  · Prescription pain medicine  may be given  Ask your healthcare provider how to take this medicine safely  · NSAIDs , such as ibuprofen, help decrease swelling, pain, and fever  NSAIDs can cause stomach bleeding or kidney problems in certain people  If you take blood thinner medicine, always ask your healthcare provider if NSAIDs are safe for you   Always read the medicine label and follow directions  · Take your medicine as directed  Contact your healthcare provider if you think your medicine is not helping or if you have side effects  Tell him or her if you are allergic to any medicine  Keep a list of the medicines, vitamins, and herbs you take  Include the amounts, and when and why you take them  Bring the list or the pill bottles to follow-up visits  Carry your medicine list with you in case of an emergency  Care for yourself at home:   · Apply ice  on your shoulder for 15 to 20 minutes every hour or as directed  Use an ice pack, or put crushed ice in a plastic bag  Cover it with a towel  Ice helps prevent tissue damage and decreases swelling and pain  · Keep your arm in a sling or immobilizer as directed  You may need to wear a sling or immobilizer to keep your shoulder close to your body and prevent movement  This may help your shoulder heal and decrease pain  Wear your sling or immobilizer at all times, unless your healthcare provider tells you otherwise  Ask your healthcare provider if you can remove your sling or immobilizer to bathe  · Move your arm only as directed  Ask your healthcare provider if you can remove your sling or immobilizer to move your arm  Ask him what arm movements are okay to do  He may tell you it is okay to straighten your arm below your elbow or move your wrist and hand  While your arm is in the sling or immobilizer, he may tell you to move your fingers, hand, and wrist 3 to 4 times per day  Some arm movements may cause injury or put too much stress on your shoulder  Do not do any of the following:      ¨ Move your arm over your head or away from your body    ¨ Lift anything with your arm or hand    ¨ Lean on the arm    ¨ Pull objects toward you with your arm    ¨ Move or twist your elbow behind your back    · Sleep in an upright position  This position may decrease pain and make it more comfortable to sleep   Place 2 to 3 pillows lengthwise behind your back when in bed  Make sure the pillows do not move your shoulder forward  Instead, you can sleep in a reclining chair  · Go to physical therapy as directed  A physical therapist teaches you exercises to help improve movement and strength, and to decrease pain  Ask your healthcare provider when you need to start physical therapy  Wound care:  Ask your healthcare provider when your wound can get wet  Carefully wash around the wound with soap and water  Allow soap and water to gently run over your incision  Do not scrub the incision  Dry the area and put on new, clean bandages as directed  Change your bandages when they get wet or dirty  Check your wound every day for signs of infection such as swelling, redness, or pus  Follow up with your healthcare provider as directed:  Write down your questions so you remember to ask them during your visits  © 2017 [employer]  Information is for End User's use only and may not be sold, redistributed or otherwise used for commercial purposes  All illustrations and images included in CareNotes® are the copyrighted property of [employer]  or [employer]  The above information is an  only  It is not intended as medical advice for individual conditions or treatments  Talk to your doctor, nurse or pharmacist before following any medical regimen to see if it is safe and effective for you

## 2019-10-23 NOTE — H&P
Ortho Sports Medicine Shoulder Follow Up Visit     Assesment:   44year old Male Left shoulder SLAP tear    Plan:     patient has failed conservative treatment including injection therapy and physical therapy and would like to proceed with surgical intervention      Imaging: All imaging from today was reviewed by myself and explained to the patient  Injection:    No Injection planned at this time  Surgery: We will proceed forward with surgical arthroscopy of the shoulder with  Arthroscopy left shoulder with the slap repair and AC joint resection with or without biceps tenotomy or tenodesis      Follow up:    No follow-ups on file  No chief complaint on file  History of Present Illness: The patient is returns for follow up of MRI of the Left shoulder  Since the prior visit, He reports minimal improvement  Pain is improved by rest and physical therapy  Pain is aggravated by overhead activity  Symptoms include catching  The patient denies weakness  The patient has tried rest and physical therapy  I have reviewed the past medical, surgical, social and family history, medications and allergies as documented in the EMR  Review of systems: ROS is negative other than that noted in the HPI  Constitutional: Negative for fatigue and fever  Physical Exam:    There were no vitals taken for this visit      General/Constitutional: NAD, well developed, well nourished  HENT: Normocephalic, atraumatic  CV: Intact distal pulses, regular rate  Resp: No respiratory distress or labored breathing  Lymphatic: No lymphadenopathy palpated  Neuro: Alert and Oriented x 3, no focal deficits  Psych: Normal mood, normal affect, normal judgement, normal behavior  Skin: Warm, dry, no rashes, no erythema      Shoulder focused exam:       RIGHT LEFT    Scapula Atrophy Negative Negative     Winging Negative Negative     Protraction Negative Negative    Rotator cuff SS 5/5 5/5     IS 5/5 5/5     SubS 5/5 5/5    ROM     170     ER0 60 60     ER90 90    90     IR90 T6    T6     IRb T6    T6    TTP: AC negative Positive     Biceps Positive Positive     Coracoid Negative Negative    Special Tests: O'Briens negative Positive     Santos-shear negative Positive     Cross body Adduction negative Positive     Speeds  negative Positive     Radha's negative Positive     Whipple Negative Negative       Neer Negative Negative     Badillo Negative Negative    Instability: Apprehension & relocation negative negative     Load & shift negative negative    Other: Crank Negative Negative               UE NV Exam: +2 Radial pulses bilaterally  Sensation intact to light touch C5-T1 bilaterally, Radial/median/ulnar nerve motor intact    Cervical ROM is full without pain, numbness or tingling      Shoulder Imaging  I have personally reviewed films in the [facility] and my interpretation is as follows MRI of the Left shoulder demonstrates tear in the superior labrum anterior to posterior     Discussed treatment options with the patient, which include no further treatment, continue non-operative measures, or surgical intervention  Risks and benefits of these treatment options discussed in detail  Patient informed that the risks of surgery include infection, bleeding, injury to blood vessels, injury to nerves, injury to adjacent structures, failure of the procedure, need for additional surgery, and potential loss of life and limb  This patient has failed non-operative measures and wishes to proceed with surgical intervention  Informed consent obtained  A copy of the consultation today has been given to the patient, and patient will call with any concerns or questions  Patient given the option to cancel the surgery or get a second opinion between now and the day of surgery

## 2019-10-23 NOTE — PROGRESS NOTES
Ortho Sports Medicine Shoulder Follow Up Visit     Assesment:   44year old Male Left shoulder SLAP tear    Plan:    -Patient has failed conservative treatment including injection therapy and physical therapy and would like to proceed with surgical intervention  -We will proceed forward with surgical arthroscopy of the shoulder with the slap repair and AC joint resection with or without biceps tenotomy or tenodesis      Follow up:    Return in about 4 weeks (around 11/20/2019)  Chief Complaint   Patient presents with    Left Shoulder - Follow-up         History of Present Illness: The patient is returns for follow up of MRI of the Left shoulder  Since the prior visit, He reports minimal improvement  Pain is improved by rest   Pain is aggravated by overhead activity  Symptoms include catching  The patient denies weakness  The patient has tried rest and physical therapy  I have reviewed the past medical, surgical, social and family history, medications and allergies as documented in the EMR  Review of systems: ROS is negative other than that noted in the HPI  Constitutional: Negative for fatigue and fever  Physical Exam:    Blood pressure 134/87, pulse 64, height 5' 7" (1 702 m), weight 74 4 kg (164 lb)      General/Constitutional: NAD, well developed, well nourished  HENT: Normocephalic, atraumatic  CV: Intact distal pulses, regular rate  Resp: No respiratory distress or labored breathing  Lymphatic: No lymphadenopathy palpated  Neuro: Alert and Oriented x 3, no focal deficits  Psych: Normal mood, normal affect, normal judgement, normal behavior  Skin: Warm, dry, no rashes, no erythema      Shoulder focused exam:       RIGHT LEFT    Scapula Atrophy Negative Negative     Winging Negative Negative     Protraction Negative Negative    Rotator cuff SS 5/5 5/5     IS 5/5 5/5     SubS 5/5 5/5    ROM     170     ER0 60 60     ER90 90    90     IR90 T6    T6     IRb T6    T6 TTP: AC negative Positive     Biceps Positive Positive     Coracoid Negative Negative    Special Tests: O'Briens negative Positive     Santos-shear negative Positive     Cross body Adduction negative Positive     Speeds  negative Positive     Radha's negative Positive     Whipple Negative Negative       Neer Negative Negative     Badillo Negative Negative    Instability: Apprehension & relocation negative negative     Load & shift negative negative    Other: Crank Negative Negative               UE NV Exam: +2 Radial pulses bilaterally  Sensation intact to light touch C5-T1 bilaterally, Radial/median/ulnar nerve motor intact    Cervical ROM is full without pain, numbness or tingling      Shoulder Imaging  I have personally reviewed films in the [facility] and my interpretation is as follows MRI of the Left shoulder demonstrates tear in the superior labrum anterior to posterior

## 2019-10-23 NOTE — LETTER
October 23, 2019     Patient: Ruslan Hull   YOB: 1980   Date of Visit: 10/23/2019       To Whom It May Concern: It is my medical opinion that Ruslan Hull may return to light duty immediately with the following restrictions:  no lifting over 10 lb with the left upper extremity  patient scheduled for left shoulder surgery   patient will not be able to work 4-12 weeks after surgery from the date of surgery  If you have any questions or concerns, please don't hesitate to call           Sincerely,        Edis Nguyen MD    CC: No Recipients

## 2019-10-23 NOTE — H&P (VIEW-ONLY)
Ortho Sports Medicine Shoulder Follow Up Visit     Assesment:   44year old Male Left shoulder SLAP tear    Plan:     patient has failed conservative treatment including injection therapy and physical therapy and would like to proceed with surgical intervention      Imaging: All imaging from today was reviewed by myself and explained to the patient  Injection:    No Injection planned at this time  Surgery: We will proceed forward with surgical arthroscopy of the shoulder with  Arthroscopy left shoulder with the slap repair and AC joint resection with or without biceps tenotomy or tenodesis      Follow up:    No follow-ups on file  No chief complaint on file  History of Present Illness: The patient is returns for follow up of MRI of the Left shoulder  Since the prior visit, He reports minimal improvement  Pain is improved by rest and physical therapy  Pain is aggravated by overhead activity  Symptoms include catching  The patient denies weakness  The patient has tried rest and physical therapy  I have reviewed the past medical, surgical, social and family history, medications and allergies as documented in the EMR  Review of systems: ROS is negative other than that noted in the HPI  Constitutional: Negative for fatigue and fever  Physical Exam:    There were no vitals taken for this visit      General/Constitutional: NAD, well developed, well nourished  HENT: Normocephalic, atraumatic  CV: Intact distal pulses, regular rate  Resp: No respiratory distress or labored breathing  Lymphatic: No lymphadenopathy palpated  Neuro: Alert and Oriented x 3, no focal deficits  Psych: Normal mood, normal affect, normal judgement, normal behavior  Skin: Warm, dry, no rashes, no erythema      Shoulder focused exam:       RIGHT LEFT    Scapula Atrophy Negative Negative     Winging Negative Negative     Protraction Negative Negative    Rotator cuff SS 5/5 5/5     IS 5/5 5/5     SubS 5/5 5/5    ROM     170     ER0 60 60     ER90 90    90     IR90 T6    T6     IRb T6    T6    TTP: AC negative Positive     Biceps Positive Positive     Coracoid Negative Negative    Special Tests: O'Briens negative Positive     Santos-shear negative Positive     Cross body Adduction negative Positive     Speeds  negative Positive     Radha's negative Positive     Whipple Negative Negative       Neer Negative Negative     Badillo Negative Negative    Instability: Apprehension & relocation negative negative     Load & shift negative negative    Other: Crank Negative Negative               UE NV Exam: +2 Radial pulses bilaterally  Sensation intact to light touch C5-T1 bilaterally, Radial/median/ulnar nerve motor intact    Cervical ROM is full without pain, numbness or tingling      Shoulder Imaging  I have personally reviewed films in the [facility] and my interpretation is as follows MRI of the Left shoulder demonstrates tear in the superior labrum anterior to posterior     Discussed treatment options with the patient, which include no further treatment, continue non-operative measures, or surgical intervention  Risks and benefits of these treatment options discussed in detail  Patient informed that the risks of surgery include infection, bleeding, injury to blood vessels, injury to nerves, injury to adjacent structures, failure of the procedure, need for additional surgery, and potential loss of life and limb  This patient has failed non-operative measures and wishes to proceed with surgical intervention  Informed consent obtained  A copy of the consultation today has been given to the patient, and patient will call with any concerns or questions  Patient given the option to cancel the surgery or get a second opinion between now and the day of surgery

## 2019-10-23 NOTE — TELEPHONE ENCOUNTER
[address] [place] [address] Rebecca   [phone]  Fax [phone]    [address] [place] [address] is calling to get mri report & documentation to authorize the patient's surgery on 10/31/19  [address] [place] [address] is stating that they will need all documentation to support that the patient must have surgery  Fax number is above

## 2019-10-24 ENCOUNTER — APPOINTMENT (OUTPATIENT)
Dept: PHYSICAL THERAPY | Facility: CLINIC | Age: 39
End: 2019-10-24
Payer: OTHER MISCELLANEOUS

## 2019-10-29 ENCOUNTER — TELEPHONE (OUTPATIENT)
Dept: OBGYN CLINIC | Facility: HOSPITAL | Age: 39
End: 2019-10-29

## 2019-10-29 NOTE — DISCHARGE INSTRUCTIONS
Shoulder Arthroscopy   WHAT YOU SHOULD KNOW:   Shoulder arthroscopy is a surgery to examine or repair your damaged or diseased shoulder joint  AFTER YOU LEAVE:   Medicines:   · Pain medicine: You may be given a prescription medicine to decrease pain  Do not wait until the pain is severe before you take this medicine  · Take your medicine as directed  Call your healthcare provider if you think your medicine is not helping or if you have side effects  Tell him if you are allergic to any medicine  Keep a list of the medicines, vitamins, and herbs you take  Include the amounts, and when and why you take them  Bring the list or the pill bottles to follow-up visits  Carry your medicine list with you in case of an emergency  Follow up with your primary healthcare provider or orthopedic surgeon as directed: You will need to return to have your shoulder checked and stitches removed  Write down your questions so you remember to ask them during your visits  Wound care:   · Wash your hands before and after you care for your incision wound  This will help prevent an infection  · Carefully wash the wound as directed  Dry the area and put on new, clean bandages as directed  Change your bandages when they get wet or dirty  · If you have steri-strips (thin strips of tape) over the wound, do not pull them off  Let them fall off by themselves  · Keep the stitches clean and dry  Do not trim or shorten the ends of your stitches  If they are rubbing on your clothing, you can put a soft gauze bandage between the stitches and your clothes  Self-care:   · Use ice:  Ice helps decrease swelling and pain  Ice may also help prevent tissue damage  Use an ice pack, or put crushed ice in a plastic bag  Cover it with a towel and place it on your shoulder for 15 to 20 minutes every hour or as directed  · Use an arm sling or a brace as directed:   You may need to wear a sling or brace to keep your shoulder close to your body and keep it from moving  This may help your shoulder heal faster and be more comfortable  Wear your sling or brace all the time, even while you sleep, until you are told it is okay to remove it  You can remove your sling or brace to bathe  · Ask about bathing:  Do not let your affected shoulder get wet unless your primary healthcare provider says it is okay  Ask your primary healthcare provider when you can bathe or swim  · Limit activities:  Do not use your arm to lift, pull, or push  Ask when you can return to sports or physical activity  Exercises:   · Home exercises:  After your surgery, you may be asked to do light and easy exercises at first  Do not bend forward from the waist or stretch your arm across your chest in front  Do not stretch your arm behind your back  You may be able to do more as you get stronger and as the pain decreases  Follow exercise instructions from your primary healthcare provider or orthopedic surgeon  · Physical therapy:  A physical therapist can teach you safe exercises to help improve movement and strength, and to decrease pain  Contact your primary healthcare provider or orthopedic surgeon if:   · You have a fever  · You have pain and swelling in your shoulder even after you take your medicines  · Your skin is itchy, swollen, or has a rash  · You have questions or concerns about your condition or care  Seek care immediately or call 911 if:   · You have chest pain or shortness of breath  · You fall and injure your shoulder  · Blood soaks through your bandage  · Any part of your arm is numb, tingly, cool to the touch, blue, or pale  · Your incision wounds are swollen, red, or have pus coming from them  · Your stitches come apart    Talk to your doctor, nurse or pharmacist before following any medical regimen to see if it is safe and effective for you      Please call Dr Clemencia Campuzano office with any Problems  [address] [facility]  [phone]

## 2019-10-29 NOTE — TELEPHONE ENCOUNTER
Dyana Ventura, [address] [place] [address],[facility] today, [phone]    Wants to know if in addition to sling if he should bring polar cube to surgery

## 2019-10-30 ENCOUNTER — ANESTHESIA EVENT (OUTPATIENT)
Dept: PERIOP | Facility: HOSPITAL | Age: 39
End: 2019-10-30
Payer: OTHER MISCELLANEOUS

## 2019-10-31 ENCOUNTER — HOSPITAL ENCOUNTER (OUTPATIENT)
Facility: HOSPITAL | Age: 39
Setting detail: OUTPATIENT SURGERY
Discharge: HOME | End: 2019-10-31
Attending: ORTHOPAEDIC SURGERY | Admitting: ORTHOPAEDIC SURGERY
Payer: OTHER MISCELLANEOUS

## 2019-10-31 ENCOUNTER — ANESTHESIA (OUTPATIENT)
Dept: PERIOP | Facility: HOSPITAL | Age: 39
End: 2019-10-31
Payer: OTHER MISCELLANEOUS

## 2019-10-31 VITALS
OXYGEN SATURATION: 96 % | RESPIRATION RATE: 18 BRPM | HEIGHT: 67 IN | TEMPERATURE: 97.4 F | BODY MASS INDEX: 25.74 KG/M2 | HEART RATE: 68 BPM | WEIGHT: 164 LBS | DIASTOLIC BLOOD PRESSURE: 78 MMHG | SYSTOLIC BLOOD PRESSURE: 112 MMHG

## 2019-10-31 DIAGNOSIS — S43.432D SLAP LESION, TYPE II, LEFT, SUBSEQUENT ENCOUNTER: Primary | ICD-10-CM

## 2019-10-31 PROCEDURE — 29824 SHO ARTHRS SRG DSTL CLAVICLC: CPT | Performed by: ORTHOPAEDIC SURGERY

## 2019-10-31 PROCEDURE — 29824 SHO ARTHRS SRG DSTL CLAVICLC: CPT | Performed by: PHYSICIAN ASSISTANT

## 2019-10-31 PROCEDURE — C9290 INJ, BUPIVACAINE LIPOSOME: HCPCS | Performed by: ORTHOPAEDIC SURGERY

## 2019-10-31 RX ORDER — METOCLOPRAMIDE HYDROCHLORIDE 5 MG/ML
10 INJECTION INTRAMUSCULAR; INTRAVENOUS ONCE AS NEEDED
Status: DISCONTINUED | OUTPATIENT
Start: 2019-10-31 | End: 2019-10-31 | Stop reason: HOSPADM

## 2019-10-31 RX ORDER — BUPIVACAINE HYDROCHLORIDE 5 MG/ML
INJECTION, SOLUTION PERINEURAL
Status: COMPLETED | OUTPATIENT
Start: 2019-10-31 | End: 2019-10-31

## 2019-10-31 RX ORDER — MAGNESIUM HYDROXIDE 1200 MG/15ML
LIQUID ORAL AS NEEDED
Status: DISCONTINUED | OUTPATIENT
Start: 2019-10-31 | End: 2019-10-31 | Stop reason: HOSPADM

## 2019-10-31 RX ORDER — MIDAZOLAM HYDROCHLORIDE 2 MG/2ML
INJECTION, SOLUTION INTRAMUSCULAR; INTRAVENOUS
Status: COMPLETED | OUTPATIENT
Start: 2019-10-31 | End: 2019-10-31

## 2019-10-31 RX ORDER — SODIUM CHLORIDE, SODIUM LACTATE, POTASSIUM CHLORIDE, CALCIUM CHLORIDE 600; 310; 30; 20 MG/100ML; MG/100ML; MG/100ML; MG/100ML
125 INJECTION, SOLUTION INTRAVENOUS CONTINUOUS
Status: DISCONTINUED | OUTPATIENT
Start: 2019-10-31 | End: 2019-10-31

## 2019-10-31 RX ORDER — LIDOCAINE HYDROCHLORIDE AND EPINEPHRINE 10; 10 MG/ML; UG/ML
INJECTION, SOLUTION INFILTRATION; PERINEURAL AS NEEDED
Status: DISCONTINUED | OUTPATIENT
Start: 2019-10-31 | End: 2019-10-31 | Stop reason: HOSPADM

## 2019-10-31 RX ORDER — LIDOCAINE HYDROCHLORIDE 10 MG/ML
INJECTION, SOLUTION INFILTRATION; PERINEURAL AS NEEDED
Status: DISCONTINUED | OUTPATIENT
Start: 2019-10-31 | End: 2019-10-31 | Stop reason: SURG

## 2019-10-31 RX ORDER — OXYCODONE HYDROCHLORIDE AND ACETAMINOPHEN 5; 325 MG/1; MG/1
1 TABLET ORAL EVERY 8 HOURS PRN
Qty: 15 TABLET | Refills: 0 | Status: SHIPPED | OUTPATIENT
Start: 2019-10-31 | End: 2019-12-03 | Stop reason: ALTCHOICE

## 2019-10-31 RX ORDER — PROPOFOL 10 MG/ML
INJECTION, EMULSION INTRAVENOUS AS NEEDED
Status: DISCONTINUED | OUTPATIENT
Start: 2019-10-31 | End: 2019-10-31 | Stop reason: SURG

## 2019-10-31 RX ORDER — ONDANSETRON 2 MG/ML
INJECTION INTRAMUSCULAR; INTRAVENOUS AS NEEDED
Status: DISCONTINUED | OUTPATIENT
Start: 2019-10-31 | End: 2019-10-31 | Stop reason: SURG

## 2019-10-31 RX ORDER — LIDOCAINE HYDROCHLORIDE 10 MG/ML
INJECTION, SOLUTION INFILTRATION; PERINEURAL
Status: COMPLETED | OUTPATIENT
Start: 2019-10-31 | End: 2019-10-31

## 2019-10-31 RX ORDER — CHLORHEXIDINE GLUCONATE 4 G/100ML
SOLUTION TOPICAL DAILY PRN
Status: DISCONTINUED | OUTPATIENT
Start: 2019-10-31 | End: 2019-10-31 | Stop reason: HOSPADM

## 2019-10-31 RX ORDER — FENTANYL CITRATE/PF 50 MCG/ML
25 SYRINGE (ML) INJECTION
Status: DISCONTINUED | OUTPATIENT
Start: 2019-10-31 | End: 2019-10-31 | Stop reason: HOSPADM

## 2019-10-31 RX ORDER — SODIUM CHLORIDE, SODIUM LACTATE, POTASSIUM CHLORIDE, CALCIUM CHLORIDE 600; 310; 30; 20 MG/100ML; MG/100ML; MG/100ML; MG/100ML
125 INJECTION, SOLUTION INTRAVENOUS CONTINUOUS
Status: ACTIVE | OUTPATIENT
Start: 2019-10-31 | End: 2019-10-31

## 2019-10-31 RX ORDER — DEXAMETHASONE SODIUM PHOSPHATE 10 MG/ML
INJECTION, SOLUTION INTRAMUSCULAR; INTRAVENOUS AS NEEDED
Status: DISCONTINUED | OUTPATIENT
Start: 2019-10-31 | End: 2019-10-31 | Stop reason: SURG

## 2019-10-31 RX ORDER — OXYCODONE HYDROCHLORIDE AND ACETAMINOPHEN 5; 325 MG/1; MG/1
2 TABLET ORAL EVERY 4 HOURS PRN
Status: DISCONTINUED | OUTPATIENT
Start: 2019-10-31 | End: 2019-10-31 | Stop reason: HOSPADM

## 2019-10-31 RX ORDER — LIDOCAINE HYDROCHLORIDE 10 MG/ML
0.5 INJECTION, SOLUTION EPIDURAL; INFILTRATION; INTRACAUDAL; PERINEURAL ONCE AS NEEDED
Status: DISCONTINUED | OUTPATIENT
Start: 2019-10-31 | End: 2019-10-31 | Stop reason: HOSPADM

## 2019-10-31 RX ORDER — METOCLOPRAMIDE HYDROCHLORIDE 5 MG/ML
INJECTION INTRAMUSCULAR; INTRAVENOUS AS NEEDED
Status: DISCONTINUED | OUTPATIENT
Start: 2019-10-31 | End: 2019-10-31 | Stop reason: SURG

## 2019-10-31 RX ORDER — CEFAZOLIN SODIUM 2 G/50ML
2000 SOLUTION INTRAVENOUS ONCE
Status: COMPLETED | OUTPATIENT
Start: 2019-10-31 | End: 2019-10-31

## 2019-10-31 RX ORDER — FENTANYL CITRATE 50 UG/ML
INJECTION, SOLUTION INTRAMUSCULAR; INTRAVENOUS
Status: COMPLETED | OUTPATIENT
Start: 2019-10-31 | End: 2019-10-31

## 2019-10-31 RX ORDER — ONDANSETRON 2 MG/ML
4 INJECTION INTRAMUSCULAR; INTRAVENOUS ONCE AS NEEDED
Status: DISCONTINUED | OUTPATIENT
Start: 2019-10-31 | End: 2019-10-31 | Stop reason: HOSPADM

## 2019-10-31 RX ADMIN — LIDOCAINE HYDROCHLORIDE 50 MG: 10 INJECTION, SOLUTION INFILTRATION; PERINEURAL at 08:57

## 2019-10-31 RX ADMIN — BUPIVACAINE HYDROCHLORIDE 5 ML: 5 INJECTION, SOLUTION PERINEURAL at 08:08

## 2019-10-31 RX ADMIN — DEXAMETHASONE SODIUM PHOSPHATE 10 MG: 10 INJECTION, SOLUTION INTRAMUSCULAR; INTRAVENOUS at 09:09

## 2019-10-31 RX ADMIN — LIDOCAINE HYDROCHLORIDE 1 ML: 10 INJECTION, SOLUTION INFILTRATION; PERINEURAL at 08:08

## 2019-10-31 RX ADMIN — BUPIVACAINE 20 ML: 13.3 INJECTION, SUSPENSION, LIPOSOMAL INFILTRATION at 08:08

## 2019-10-31 RX ADMIN — SODIUM CHLORIDE, SODIUM LACTATE, POTASSIUM CHLORIDE, AND CALCIUM CHLORIDE 125 ML/HR: .6; .31; .03; .02 INJECTION, SOLUTION INTRAVENOUS at 07:42

## 2019-10-31 RX ADMIN — MIDAZOLAM HYDROCHLORIDE 2 MG: 1 INJECTION, SOLUTION INTRAMUSCULAR; INTRAVENOUS at 08:08

## 2019-10-31 RX ADMIN — FENTANYL CITRATE 50 MCG: 50 INJECTION, SOLUTION INTRAMUSCULAR; INTRAVENOUS at 08:08

## 2019-10-31 RX ADMIN — CEFAZOLIN SODIUM 2000 MG: 2 SOLUTION INTRAVENOUS at 08:45

## 2019-10-31 RX ADMIN — ONDANSETRON HYDROCHLORIDE 4 MG: 2 INJECTION, SOLUTION INTRAVENOUS at 09:59

## 2019-10-31 RX ADMIN — METOCLOPRAMIDE HYDROCHLORIDE 10 MG: 5 INJECTION INTRAMUSCULAR; INTRAVENOUS at 09:27

## 2019-10-31 RX ADMIN — FENTANYL CITRATE 50 MCG: 50 INJECTION, SOLUTION INTRAMUSCULAR; INTRAVENOUS at 09:14

## 2019-10-31 RX ADMIN — PROPOFOL 200 MG: 10 INJECTION, EMULSION INTRAVENOUS at 08:57

## 2019-10-31 NOTE — ANESTHESIA POSTPROCEDURE EVALUATION
Post-Op Assessment Note    CV Status:  Stable       Mental Status:  Arousable   Hydration Status:  Stable   PONV Controlled:  None   Airway Patency:  Patent   Post Op Vitals Reviewed: Yes      Staff: CRNA           BP   145/67   Temp   98 4   Pulse  54   Resp   15   SpO2   98%

## 2019-10-31 NOTE — INTERVAL H&P NOTE
H&P reviewed  After examining the patient I find no changes in the patients condition since the H&P had been written      Vitals:    10/31/19 0720   BP: 122/69   Pulse: 68   Resp: 18   Temp: 98 °F (36 7 °C)   SpO2: 97%

## 2019-10-31 NOTE — ANESTHESIA PREPROCEDURE EVALUATION
Review of Systems/Medical History  Patient summary reviewed  Chart reviewed  History of anesthetic complications (well controlled after first anesthetic as child) PONV    Cardiovascular  Negative cardio ROS    Pulmonary  Negative pulmonary ROS No recent URI , No sleep apnea ,        GI/Hepatic  Negative GI/hepatic ROS   No GERD ,        Negative  ROS        Endo/Other  Negative endo/other ROS      GYN       Hematology  Negative hematology ROS      Musculoskeletal  Negative musculoskeletal ROS        Neurology  Negative neurology ROS      Psychology   Negative psychology ROS              Physical Exam    Airway    Mallampati score: I  TM Distance: >3 FB  Neck ROM: full     Dental   No notable dental hx     Cardiovascular  Comment: Negative ROS,     Pulmonary      Other Findings      No recent labs    Anesthesia Plan  ASA Score- 1     Anesthesia Type- general and regional with ASA Monitors  Additional Monitors:   Airway Plan:         Plan Factors-    Induction- intravenous  Postoperative Plan-     Informed Consent- Anesthetic plan and risks discussed with patient and father  I personally reviewed this patient with the CRNA  Discussed and agreed on the Anesthesia Plan with the CRNA  Jake Duong

## 2019-10-31 NOTE — OP NOTE
OPERATIVE REPORT  PATIENT NAME: Becky Toledo    :  1980  MRN: [id number]  Pt Location: [facility] ROOM 02    SURGERY DATE: 10/31/2019    Surgeon(s) and Role:     * Christa Hendrickson MD - Primary     * Alyssa Zavaleta PA-C - Assisting no qualified resident available, physician assistant helped medically necessary for patient positioning, camera handling, wound closure, application of dressing all under my direct supervision    Preop Diagnosis:  SLAP lesion, type II, left, subsequent encounter [S43 432D]  AC joint arthritis    Post-Op Diagnosis Codes:  Subacromial impingement, AC joint arthritis    Procedure(s) (LRB):  ARTHROSCOPY SHOULDER,  AC Joint Resection, SAD, Synovectomy (Left)    Specimen(s):  * No specimens in log *    Estimated Blood Loss:   Minimal    Drains:  * No LDAs found *    Anesthesia Type:   General w/ Interscalene Block    Operative Indications:  SLAP lesion, type II, left, subsequent encounter [S43 432D]  Pain left shoulder temporary respond to nonoperative measures    Operative Findings:  Intact glenohumeral joint  Intact biceps, labrum, rotator cuff on the joint side, subscap, glenohumeral joint intact no loose bodies  Bursitis in subacromial bursa excised intact rotator cuff  AC joint narrow distal end of clavicle 5 mm excised arthroscopic    Complications:   None    Procedure and Technique: All treatment options were discussed with the patient including non-operative and operative treatment options  Patient has failed non-perative treatment and has opted for surgical intervention  Risks, complications and benefits of all treatment options were discussed in detail  The risks of surgical intervention including infection, injury to vessels and nerves  risk of failure to achieve desired results, risk of need for further procedures, potential risk of loss of life and limb were discussed with the patient  Informed consent was obtained from the patient  The operative site was marked and signed  A timeout was performed prior to the procedure  The patient was re-identified ,including name, date of birth, procedure, consent form reviewed, site and laterality  Appropriate antibiotics were administered preoperatively    The Physician Assistant was present for the entire case and provided essential assistance with patient positioning, prep and draping, wound closure, sterile dressing and splint application, all under my direct supervision(there was no resident available to assist with this case)  Patient was placed in the lateral decubitus position with all bony problems well padded  An axillary roll was used  Left shoulder is examined found to be stable  Left shoulder was then prepared and draped in sterile fashion  20 cc of lidocaine with epi was used for portal site analgesia and hemostasis  Standard posterior anterior lateral arthroscopic portals were used  Glenohumeral joint arthroscopy revealed intact biceps tendon intact labrum was probed thoroughly  Glenohumeral joint articular cartilage was intact  Subscap was intact  Minimal inflammation in the rotator interval   The rotator cuff was then examined thoroughly found to be intact front to back a no loose bodies posterior labrum was also intact    Scope was then placed subacromial bursa bursitis especially posterior resected  Cuff was examined front to back intact  No spurs under the acromion    AC joint extremely narrow and distal 5 mm of the clavicle excised with the irena and shaver   I was present for the entire procedure    Patient Disposition:  PACU     SIGNATURE: Christa Hendrickson MD  DATE: October 31, 2019  TIME: 10:03 AM

## 2019-10-31 NOTE — ANESTHESIA PROCEDURE NOTES
Peripheral Block    Patient location during procedure: holding area  Start time: 10/31/2019 8:08 AM  Reason for block: at surgeon's request and post-op pain management  Staffing  Anesthesiologist: Jorge Connelly MD  Performed: anesthesiologist   Preanesthetic Checklist  Completed: patient identified, site marked, surgical consent, pre-op evaluation, timeout performed, IV checked, risks and benefits discussed and monitors and equipment checked  Peripheral Block  Patient position: sitting  Prep: ChloraPrep  Patient monitoring: heart rate, cardiac monitor, continuous pulse ox and frequent blood pressure checks  Block type: interscalene  Laterality: left  Injection technique: single-shot  Procedures: ultrasound guided, Ultrasound guidance required for the procedure to increase accuracy and safety of medication placement and decrease risk of complications    Ultrasound permanent image savedbupivacaine (MARCAINE) 0 5 % perineural infiltration, 5 mL  fentaNYL 50 mcg/mL IV, 50 mcg  midazolam (VERSED) 2 mg/2 mL IV, 2 mg  lidocaine (XYLOCAINE) 1 % infiltration, 1 mL  Needle  Needle type: Stimuplex   Needle gauge: 22 G  Needle length: 5 cm  Needle localization: ultrasound guidance  Assessment  Injection assessment: incremental injection, local visualized surrounding nerve on ultrasound, negative aspiration for heme and no paresthesia on injection  Paresthesia pain: none  Heart rate change: no  Slow fractionated injection: yes  Post-procedure:  site cleaned  patient tolerated the procedure well with no immediate complications

## 2019-10-31 NOTE — LETTER
October 31, 2019     Patient: Baby Fat   YOB: 1980   Date of Visit: 10/23/2019       To Whom It May Concern: It is my medical opinion that Baby Fat should remain out of work until Three weeks  If you have any questions or concerns, please don't hesitate to call  Sincerely,        No name on file      CC: No Recipients

## 2019-11-05 ENCOUNTER — OFFICE VISIT (OUTPATIENT)
Dept: OBGYN CLINIC | Facility: CLINIC | Age: 39
End: 2019-11-05

## 2019-11-05 VITALS
SYSTOLIC BLOOD PRESSURE: 127 MMHG | HEART RATE: 71 BPM | WEIGHT: 167 LBS | DIASTOLIC BLOOD PRESSURE: 76 MMHG | BODY MASS INDEX: 26.21 KG/M2 | HEIGHT: 67 IN

## 2019-11-05 DIAGNOSIS — Z98.890 STATUS POST ARTHROSCOPY OF LEFT SHOULDER: Primary | ICD-10-CM

## 2019-11-05 PROCEDURE — 99024 POSTOP FOLLOW-UP VISIT: CPT | Performed by: ORTHOPAEDIC SURGERY

## 2019-11-05 NOTE — LETTER
November 5, 2019     Patient: Scar Sethi   YOB: 1980   Date of Visit: 11/5/2019       To Whom It May Concern: It is my medical opinion that Scar Sethi should remain out of work until  next office visit  If you have any questions or concerns, please don't hesitate to call           Sincerely,        Luciana Borden MD    CC: Scar Jossie

## 2019-11-05 NOTE — PROGRESS NOTES
Chief Complaint   status post left shoulder arthroscopy    History Of Presenting Illness  Yudi Holloway 1980 presents with  Left shoulder arthroscopy with arthroscopic resection of distal clavicle done on October 31st, 2019  Patient presents for 1st postop evaluation  Patient doing reasonably well so far  Current Medications  Current Outpatient Medications   Medication Sig Dispense Refill    ibuprofen (MOTRIN) 800 mg tablet Take by mouth every 8 (eight) hours as needed for mild pain      oxyCODONE-acetaminophen (PERCOCET) 5-325 mg per tablet Take 1 tablet by mouth every 8 (eight) hours as needed for moderate pain for up to 15 dosesMax Daily Amount: 3 tablets 15 tablet 0     No current facility-administered medications for this visit          Current Problems    Active Problems:   Patient Active Problem List    Diagnosis Date Noted    SLAP lesion, type II, left, subsequent encounter 10/23/2019         Review of Systems:    General: negative for - chills, fatigue, fever,  weight gain or weight loss  Psychological: negative for - anxiety, behavioral disorder, concentration difficulties  Ophthalmic: negative for - blurry vision, decreased vision, double vision,      Past Medical History:   Past Medical History:   Diagnosis Date    DDD (degenerative disc disease), cervical     H/O shoulder surgery     left side    Neck pain     Occasional    PONV (postoperative nausea and vomiting)     Seasonal allergies     Wears glasses        Past Surgical History:   Past Surgical History:   Procedure Laterality Date    CONTROL BLEED POST TONSILLECTOMY  10/31/2017    Procedure: CONTROL BLEED POST TONSILLECTOMY;  Surgeon: Eugenio Schmitt DO;  Location: [facility];  Service: ENT    HEMORRHOID SURGERY      x2    WI REMOVAL OF TONSILS,12+ Y/O N/A 10/31/2017    Procedure: TONSILLECTOMY;  Surgeon: Eugenio Schmitt DO;  Location: [facility];  Service: ENT    WI SHLDR ARTHROSCOP,SURG,REPAIR,SLAP LESION Left 10/31/2019 Procedure: ARTHROSCOPY SHOULDER,  AC Joint Resection, SAD, Synovectomy;  Surgeon: Rosetta Briceño MD;  Location: [facility];  Service: Orthopedics    SHOULDER SURGERY Right     x2- decompression    TONSILLECTOMY      WISDOM TOOTH EXTRACTION         Family History:  Family history reviewed and non-contributory  Family History   Problem Relation Age of Onset    Graves' disease Mother     Hypertension Father     Cancer Paternal Uncle        Social History:  Social History     Socioeconomic History    Marital status: /Civil Union     Spouse name: None    Number of children: None    Years of education: None    Highest education level: None   Occupational History    None   Social Needs    Financial resource strain: None    Food insecurity:     Worry: None     Inability: None    Transportation needs:     Medical: None     Non-medical: None   Tobacco Use    Smoking status: Former Smoker     Packs/day:      Years: 12 00     Pack years: 12 00     Types: Cigarettes     Last attempt to quit:      Years since quittin 8    Smokeless tobacco: Never Used   Substance and Sexual Activity    Alcohol use: Yes     Comment: 1 weekly    Drug use: No    Sexual activity: None   Lifestyle    Physical activity:     Days per week: None     Minutes per session: None    Stress: None   Relationships    Social connections:     Talks on phone: None     Gets together: None     Attends [employer] service: None     Active member of club or organization: None     Attends meetings of clubs or organizations: None     Relationship status: None    Intimate partner violence:     Fear of current or ex partner: None     Emotionally abused: None     Physically abused: None     Forced sexual activity: None   Other Topics Concern    None   Social History Narrative    None       Allergies:   No Known Allergies        Physical ExaminationBP 127/76   Pulse 71   Ht 5' 7" (1 702 m)   Wt 75 8 kg (167 lb)   BMI 26 16 kg/m²   Gen: Alert and oriented to person, place, time  HEENT: EOMI, eyes clear, moist mucus membranes, hearing intact      Orthopedic Exam    Left shoulder incisions healed no evidence of infection sutures removed Steri-Strips applied   no motor or sensory deficits left upper extremity flexion 100 AB duction 90          Impression   stable s/p left shoulder arthroscopy the excision distal clavicle      Plan     patient allowed all activities as tolerated   Will start physical therapy   follow-up in 4 weeks x-ray left shoulder on arrival    Janey Dent MD        Portions of the record may have been created with voice recognition software  Occasional wrong word or "sound a like" substitutions may have occurred due to the inherent limitations of voice recognition software  Read the chart carefully and recognize, using context, where substitutions have occurred

## 2019-11-07 ENCOUNTER — EVALUATION (OUTPATIENT)
Dept: PHYSICAL THERAPY | Facility: CLINIC | Age: 39
End: 2019-11-07
Payer: OTHER MISCELLANEOUS

## 2019-11-07 DIAGNOSIS — Z98.890 STATUS POST ARTHROSCOPY OF LEFT SHOULDER: Primary | ICD-10-CM

## 2019-11-07 PROCEDURE — 97164 PT RE-EVAL EST PLAN CARE: CPT | Performed by: PHYSICAL THERAPIST

## 2019-11-07 PROCEDURE — 97140 MANUAL THERAPY 1/> REGIONS: CPT | Performed by: PHYSICAL THERAPIST

## 2019-11-07 NOTE — PROGRESS NOTES
Daily Note     Today's date: 2019  Patient name: Nick Wade  : 1980  MRN: [id number]  Referring provider: Abby Oh MD  Dx:   Encounter Diagnosis     ICD-10-CM    1  Status post arthroscopy of left shoulder Z98 [id number]        Start Time: 1010  Stop Time: 1055  Total time in clinic (min): 45 minutes  Assessment  Nick Wade returns to PT following arthroscopic shoulder surgery L shoulder by Dr Zion Toribio on 10/31/19  Currently patient demonstrates limited use of L shoulder, limitations in L shoulder AROM/PROM and strength  Patient with significant pain, currently being managed with medication and ice  Patient will benefit from PT to restore shoulder ROM and strength and reduce pain levels  Goal to get patient back to work without lifting restrictions  Impairments: abnormal or restricted ROM, activity intolerance, impaired physical strength, pain with function and scapular dyskinesis  Functional limitations: limited use of L UE (sling when not at home), limited ROM and strength  Understanding of Dx/Px/POC: good   Prognosis: good    Goals  Updated at  19:    STGs  1) In 4 weeks patient will report reduced pain 50% or greater, without pain medication  2) In 4 weeks patient will demonstrate independence with HEP  5) In 4 weeks patient will demonstrate L shoulder strength of 4-/5 or greater in all directions with MMT       LTGs  1) In 4-8 weeks patient will demonstrate pain free L shoulder ROM WNL all directions  2) In 4-8 weeks patient will demonstrate L shoulder strength of at least 4/5 MMT all directions  3) in 4-8 weeks patient will demonstrate ability to lift at least 10 lbs above shoulder height with L UE    Plan  Patient would benefit from: skilled physical therapy  Referral necessary: No  Planned modality interventions: cryotherapy, TENS, unattended electrical stimulation and thermotherapy: hydrocollator packs  Planned therapy interventions: joint mobilization, manual therapy, massage, Maloney taping, home exercise program, functional ROM exercises, flexibility, therapeutic exercise, stretching, strengthening, patient education, self care and postural training  Frequency: 3x week  Duration in weeks: 4  Plan of Care beginning date: 11/7/2019  Plan of Care expiration date: 12/5/2019  Treatment plan discussed with: patient        Subjective Evaluation    History of Present Illness  Date of onset: 8/23/2019  Mechanism of injury: Patient presents to PT after work related injury  Patient is employed as a  at [employer]  Reports injury on 8/23/19 to L shoulder when attempting to lift a 10 lb box from about waist height and felt an immediate sharp pain in the L shoulder  He stopped working at that moment, reported to his manager, went on lunch and the pain worsened  Saw WC doc that same day  Had xrays but no MRI at this time  Patient saw ortho doc (referring Dr Preston Marc) on Tues 9/17 and received steroid injection which provided much relief from pain on the day of and day after the injection  Reports the "grinding" is also less since the injection  Pain is primarily L shoulder superior and anterior  Occasionally feels "sore" into L elbow and forearm  Reports intermittent "numbness" along lateral aspect of forearm if he rests arm on pillow  Pain overall has gradually improved since the injury occurred  He uses a sling when out in public to avoid using his arm  RA 10/14:  Patient reports he is feeling better since he started PT  He reports compliance with lifting restrictions at home and at work, however he still has pain with repetitive use of L UE, WB onto L UE and with reaching/pushing/pulling  Nolvia has pain with quick sudden movements  He feels about 50% improved since his SOC  Reports that he no longer experiences the intermittent "numbness" along lateral aspect of forearm  Still using his sling when out in public and when at work not having to use L UE       RA 11/7:  Patient returns to PT after surgery on L shoulder 10/31/19  He denies any complications from the surgery  He reports nerve block lasted 3 days before wearing off  He now takes  mg as needed, usually in the morning  Takes prescribed oxycodone as needed as prescribed, but usually only needs to take once a day before bed  Patient is using his sling when "out and about" but does not use it when inside  Overall he feels a little less functional right now compared to before surgery, but feels it is already starting to feel better as the days go on  Patient has a f/u with surgeon in beginning of December  Will not be working at least until that follow up  Pain  IE    RA 10/14: RA :  Current pain ratin  3  3  At best pain ratin  0  1 (w/meds)  At worst pain ratin  7  5  Quality: ache    Relieving factors: ice and medications  RA 10/14: takes Aleve or Tylenol prn (reports taking this about every other day), uses ice daily  RA : support (sling), rx medications, ice (using machine that was provided after surgery)    Aggravating factors: overhead activity and lifting  RA 10/14: patient has been compliant with not lifting >10 lbs, pain increases with WB/leaning onto L UE and when using L UE for prolonged periods (hand held computer , etc) , pain with lowering L UE from elevated position and with reaching forward, pushing/pulling  RA : lifting    Social Support  Lives in: [facility]  Lives with: spouse and young children    Employment status: not working (employed as a  at [employer], currently injured)  Hand dominance: left  Exercise history: daily walks    Treatments  Current treatment: injection treatment and medication  Patient Goals  Patient goals for therapy: return to work and decreased pain          Objective     Tenderness     Left Shoulder   Tenderness in the [facility] joint, acromion, clavicle and coracoid process   No tenderness in the biceps tendon (proximal), bicipital groove, infraspinatus tendon, lateral scapula, medial scapula, scapular spine, SC joint, subscapularis tendon and supraspinatus tendon  RA 10/14: (+) ttp persists on L AC jt, infraspinatus and supraspinatus tendons  RA 11/7: Active Range of Motion    Cervical  Flexion:  WFL  Extension:  WFL  Left lateral flexion:  WFL  Right lateral flexion:  WFL  Left rotation:  WFL  Right rotation:  [facility]    IE      RA 10/14    Left Shoulder   Flexion: 110 degrees with pain  135 deg with pain  Extension: 30 degrees    65 deg  Abduction: 60 degrees with pain  60 deg (pain in AC jt), can lift to 150* with pain  External rotation BTH: T1 with pain  T6  Internal rotation BTB: L2 with pain  L1 with pain    RA 11/7:  Flexion: 145 deg w/pain  Extension: 25 deg w/pain  Abduction: 85 deg w/pain  External rotation BTH: C6 w/pain  Internal rotation BTB: T11    Additional Active Range of Motion Details  Pain reproduced with all AROM  Extension, IR, ER >> abduction and flexion  RA 10/14: pain reproduced with AROM flexion, abduction and IR  RA 11/7: pain reproduced w/AROM flexion beginning at 90 deg and moves slowly to continue raising arm, reports "strain"     Passive Range of Motion   IE      RA 10/14  Left Shoulder   Flexion: [facility]     WFL pain free  Abduction: 50 degrees with pain  150 deg pain free  External rotation 90°: 65 degrees   90+ deg pain free  Internal rotation 90°: 40 degrees with pain 50 deg pain free    RA 11/7:  Flexion: 152 deg   Abduction: 120 deg  External rotation @ 90: 70 deg  Internal rotation @ 90: 25 deg    Strength/Myotome Testing   IE      RA 10/14  RA 11/7:  Left Shoulder     Planes of Motion   Flexion: 4-      4+   NT  Extension: 4-      4+   NT  Abduction: 4-      4- with pain  NT  External rotation at 0°: 4-    4+   NT  External rotation at 90°: 4-    4+   NT    Additional Strength Details  MMT tested with shoulder shoulder neutral (arm by side)  RA 11/7: NT due to recent surgery  Will test at next assessment  Tests     Left Shoulder   Positive anterior load and shift, scapular relocation  and scapular assistance test positive  Negative belly press, clunk, crank, Hawkin's, Neer's, painful arc, passive horizontal adduction and bicep load      RA 10/14: (-) anterior load and shift, (+) scapular relocation, (+) scapular assistance test  RA 11/7: N/A, post-op      Flowsheet Rows      Most Recent Value   PT/OT G-Codes   Current Score  32   Projected Score  67        Precautions: none  Re-evaluation due 12/5  Manual  11/7       L shoulder PROM to jayda x15 mins                           Exercise Diary  11/7       HEP instruction pendulums       Pulleys        Finger ladder        Isometric shoulder IR/ER        Isometric shoulder flex/ext/abd        Scap retractions        Biceps curls        Supine cane AAROM flexion        Supine cane AAROM scaption        Standing cane AAROM extension                                                                    Modalities  11/7       Stim/CP post tx to L shoulder Defers CP to home

## 2019-11-11 ENCOUNTER — OFFICE VISIT (OUTPATIENT)
Dept: PHYSICAL THERAPY | Facility: CLINIC | Age: 39
End: 2019-11-11
Payer: OTHER MISCELLANEOUS

## 2019-11-11 DIAGNOSIS — Z98.890 STATUS POST ARTHROSCOPY OF LEFT SHOULDER: Primary | ICD-10-CM

## 2019-11-11 DIAGNOSIS — S49.92XD ACROMIOCLAVICULAR JOINT INJURY, LEFT, SUBSEQUENT ENCOUNTER: ICD-10-CM

## 2019-11-11 DIAGNOSIS — M25.512 ACUTE PAIN OF LEFT SHOULDER: ICD-10-CM

## 2019-11-11 PROCEDURE — 97110 THERAPEUTIC EXERCISES: CPT | Performed by: PHYSICAL THERAPIST

## 2019-11-11 PROCEDURE — 97140 MANUAL THERAPY 1/> REGIONS: CPT | Performed by: PHYSICAL THERAPIST

## 2019-11-11 NOTE — PROGRESS NOTES
Daily Note     Today's date: 2019  Patient name: Melani Cardozo  : 1980  MRN: [id number]  Referring provider: Mercedes Mares MD  Dx:   Encounter Diagnosis     ICD-10-CM    1  Status post arthroscopy of left shoulder Z98 [id number]    2  Acute pain of left shoulder M25 512    3  Acromioclavicular joint injury, left, subsequent encounter S49  [id number]        Start Time: 0600  Stop Time: 0700  Total time in clinic (min): 60 minutes    Subjective: Patient reports he is doing okay  "I get some cramps in the shoulder sometimes" and points to middle delt of L shoulder  Objective: See treatment diary below      Assessment: Tolerated treatment well  Mild shoulder ROM restrictions present with manual stretching, most notably abduction and IR  Patient demonstrated fatigue post treatment, exhibited good technique with therapeutic exercises and would benefit from continued PT      Plan: Continue per plan of care  Progress treatment as tolerated         Precautions: none  Re-evaluation due   Manual        L shoulder PROM to jayda x15 mins x15 mins                          Exercise Diary        HEP instruction pendulums       Pendulums - fwd/back, side/side, cw/ccw  All directions  2 mins ea      Pulleys  Flex/scap  10"x10 ea      Finger ladder  Flex/scap  5" x10 ea      Isometric shoulder IR/ER  5" 2x10 ea      Isometric shoulder flex/ext/abd        Scap retractions  5" x20      Biceps curls        Supine cane AAROM flexion        Supine cane AAROM scaption        Standing cane AAROM extension  5" x20                                                                  Modalities        Stim/CP post tx to L shoulder Defers CP to home  Defers CP to home

## 2019-11-13 ENCOUNTER — OFFICE VISIT (OUTPATIENT)
Dept: PHYSICAL THERAPY | Facility: CLINIC | Age: 39
End: 2019-11-13
Payer: OTHER MISCELLANEOUS

## 2019-11-13 DIAGNOSIS — M25.512 ACUTE PAIN OF LEFT SHOULDER: ICD-10-CM

## 2019-11-13 DIAGNOSIS — Z98.890 STATUS POST ARTHROSCOPY OF LEFT SHOULDER: Primary | ICD-10-CM

## 2019-11-13 DIAGNOSIS — S49.92XD ACROMIOCLAVICULAR JOINT INJURY, LEFT, SUBSEQUENT ENCOUNTER: ICD-10-CM

## 2019-11-13 PROCEDURE — 97140 MANUAL THERAPY 1/> REGIONS: CPT

## 2019-11-13 PROCEDURE — 97110 THERAPEUTIC EXERCISES: CPT

## 2019-11-13 NOTE — PROGRESS NOTES
Daily Note     Today's date: 2019  Patient name: Braulio Pedroza  : 1980  MRN: [id number]  Referring provider: Araceli Arriaza MD  Dx:   Encounter Diagnosis     ICD-10-CM    1  Status post arthroscopy of left shoulder Z98 890    2  Acute pain of left shoulder M25 512    3  Acromioclavicular joint injury, left, subsequent encounter S49  92XD                   Subjective: "I felt okay after Monday's treatment  My shoulder is a little sore but I do not have any true pain "      Objective: See treatment diary below      Assessment: Tolerated treatment well  Initiated flex/ext isometric this date  Pt also performed supine flexion with a cane to increase AAROM of the L shoulder  PROM performed to tolerance  Patient demonstrated fatigue post treatment and would benefit from continued PT      Plan: Continue per plan of care        Precautions: none  Re-evaluation due   Manual       L shoulder PROM to jayda x15 mins x15 mins x15 min                         Exercise Diary       HEP instruction pendulums       Pendulums - fwd/back, side/side, cw/ccw  All directions  2 mins ea All directions  2 mins ea     Pulleys  Flex/scap  10"x10 ea Flex/scap  5" x10 ea     Finger ladder  Flex/scap  5" x10 ea Flex/scap  5" x10 ea     Isometric shoulder IR/ER  5" 2x10 ea 5" 2x10 ea     Isometric shoulder flex/ext/abd   5" 2x10 ea Flex/Ext     Scap retractions  5" x20 5" x20     Biceps curls        Supine cane AAROM flexion   5" x20     Supine cane AAROM scaption        Standing cane AAROM extension  5" x20 5" x20                                                                 Modalities       Stim/CP post tx to L shoulder Defers CP to home  Defers CP to home NP

## 2019-11-15 ENCOUNTER — OFFICE VISIT (OUTPATIENT)
Dept: PHYSICAL THERAPY | Facility: CLINIC | Age: 39
End: 2019-11-15
Payer: OTHER MISCELLANEOUS

## 2019-11-15 DIAGNOSIS — S49.92XD ACROMIOCLAVICULAR JOINT INJURY, LEFT, SUBSEQUENT ENCOUNTER: ICD-10-CM

## 2019-11-15 DIAGNOSIS — M25.512 ACUTE PAIN OF LEFT SHOULDER: ICD-10-CM

## 2019-11-15 DIAGNOSIS — Z98.890 STATUS POST ARTHROSCOPY OF LEFT SHOULDER: Primary | ICD-10-CM

## 2019-11-15 PROCEDURE — 97110 THERAPEUTIC EXERCISES: CPT

## 2019-11-15 PROCEDURE — 97140 MANUAL THERAPY 1/> REGIONS: CPT

## 2019-11-15 NOTE — PROGRESS NOTES
Daily Note     Today's date: 11/15/2019  Patient name: Gm Fountain  : 1980  MRN: [id number]  Referring provider: Taz Wise MD  Dx:   Encounter Diagnosis     ICD-10-CM    1  Status post arthroscopy of left shoulder Z98 890    2  Acute pain of left shoulder M25 512    3  Acromioclavicular joint injury, left, subsequent encounter S49  92XD                   Subjective: "My shoulder is just sore when I move it to much "      Objective: See treatment diary below      Assessment: Tolerated treatment well  Initiated L shoulder AAROM with cane in the scaption  PROM of the L shoulder WFL with minor pain at end range  Patient demonstrated fatigue post treatment and would benefit from continued PT  Plan: Continue per plan of care        Precautions: none  Re-evaluation due   Manual  11/7 11/11 11/13 11/15    L shoulder PROM to jayda x15 mins x15 mins x15 min x15 min                        Exercise Diary  11/7 11/11 11/13 11/15    HEP instruction pendulums       Pendulums - fwd/back, side/side, cw/ccw  All directions  2 mins ea All directions  2 mins ea All directions  2 mins ea    Pulleys  Flex/scap  10"x10 ea Flex/scap  5" x10 ea Flex/scap  10"x10 ea    Finger ladder  Flex/scap  5" x10 ea Flex/scap  5" x10 ea Flex/scap  5"x10 ea    Isometric shoulder IR/ER  5" 2x10 ea 5" 2x10 ea 5" 2x10 ea    Isometric shoulder flex/ext/abd   5" 2x10 ea Flex/Ext 5" 2x10 ea Flex/Ext    Scap retractions  5" x20 5" x20 5" x20    Biceps curls        Supine cane AAROM flexion   5" x20 5" x20    Supine cane AAROM scaption    5" x20    Standing cane AAROM extension  5" x20 5" x20 5" x20                                                                Modalities  11/7 11/11 11/13 11/15    Stim/CP post tx to L shoulder Defers CP to home  Defers CP to home NP NP

## 2019-11-18 ENCOUNTER — OFFICE VISIT (OUTPATIENT)
Dept: PHYSICAL THERAPY | Facility: CLINIC | Age: 39
End: 2019-11-18
Payer: OTHER MISCELLANEOUS

## 2019-11-18 DIAGNOSIS — S49.92XD ACROMIOCLAVICULAR JOINT INJURY, LEFT, SUBSEQUENT ENCOUNTER: ICD-10-CM

## 2019-11-18 DIAGNOSIS — Z98.890 STATUS POST ARTHROSCOPY OF LEFT SHOULDER: Primary | ICD-10-CM

## 2019-11-18 DIAGNOSIS — M25.512 ACUTE PAIN OF LEFT SHOULDER: ICD-10-CM

## 2019-11-18 PROCEDURE — 97140 MANUAL THERAPY 1/> REGIONS: CPT

## 2019-11-18 PROCEDURE — 97110 THERAPEUTIC EXERCISES: CPT

## 2019-11-18 NOTE — PROGRESS NOTES
Daily Note     Today's date: 2019  Patient name: Deisi Saldana  : 1980  MRN: [id number]  Referring provider: Fredy Valles MD  Dx:   Encounter Diagnosis     ICD-10-CM    1  Status post arthroscopy of left shoulder Z98 890    2  Acute pain of left shoulder M25 512    3  Acromioclavicular joint injury, left, subsequent encounter S49  92XD                   Subjective: "I am sore  I have been doing more at home  If I over do it, my shoulder lets me know and I have to take a break "      Objective: See treatment diary below      Assessment: Tolerated treatment well  Initiated multiple TB exercises to strengthen the L shoulder  PROM of the L shoulder WFL  Patient demonstrated fatigue post treatment and would benefit from continued PT      Plan: Continue per plan of care        Precautions: none  Re-evaluation due   Manual  11/7 11/11 11/13 11/15 11/18   L shoulder PROM to jayda x15 mins x15 mins x15 min x15 min x15 min                       Exercise Diary  11/7 11/11 11/13 11/15 11/18   HEP instruction pendulums       Pendulums - fwd/back, side/side, cw/ccw  All directions  2 mins ea All directions  2 mins ea All directions  2 mins ea All directions  2 mins ea   Pulleys  Flex/scap  10"x10 ea Flex/scap  5" x10 ea Flex/scap  10"x10 ea Flex/scap  10"x10 ea   Finger ladder  Flex/scap  5" x10 ea Flex/scap  5" x10 ea Flex/scap  5"x10 ea Flex/scap  5"x10 ea   Isometric shoulder IR/ER  5" 2x10 ea 5" 2x10 ea 5" 2x10 ea 5" 2x10 ea   Isometric shoulder flex/ext/abd   5" 2x10 ea Flex/Ext 5" 2x10 ea Flex/Ext 5" 2x10 ea Flex/Ext   Scap retractions  5" x20 5" x20 5" x20 5" x20   Biceps curls        Supine cane AAROM flexion   5" x20 5" x20 5" x20   Supine cane AAROM scaption    5" x20 5" x20   Standing cane AAROM extension  5" x20 5" x20 5" x20 5" x20   MTP with TB     Red TB 5" 2x10   LTP with TB     Red TB 5" 2x10                                               Modalities  11/7 11/11 11/13 11/15 11/18   Stim/CP post tx to L shoulder Defers CP to home  Defers CP to home NP NP NP

## 2019-11-20 ENCOUNTER — OFFICE VISIT (OUTPATIENT)
Dept: PHYSICAL THERAPY | Facility: CLINIC | Age: 39
End: 2019-11-20
Payer: OTHER MISCELLANEOUS

## 2019-11-20 DIAGNOSIS — M25.512 ACUTE PAIN OF LEFT SHOULDER: ICD-10-CM

## 2019-11-20 DIAGNOSIS — Z98.890 STATUS POST ARTHROSCOPY OF LEFT SHOULDER: Primary | ICD-10-CM

## 2019-11-20 DIAGNOSIS — S49.92XD ACROMIOCLAVICULAR JOINT INJURY, LEFT, SUBSEQUENT ENCOUNTER: ICD-10-CM

## 2019-11-20 PROCEDURE — 97110 THERAPEUTIC EXERCISES: CPT

## 2019-11-20 PROCEDURE — 97140 MANUAL THERAPY 1/> REGIONS: CPT

## 2019-11-20 NOTE — PROGRESS NOTES
Daily Note     Today's date: 2019  Patient name: Sammy Talley  : 1980  MRN: [id number]  Referring provider: Radha Vang MD  Dx:   Encounter Diagnosis     ICD-10-CM    1  Status post arthroscopy of left shoulder Z98 890    2  Acute pain of left shoulder M25 512    3  Acromioclavicular joint injury, left, subsequent encounter S49  92XD                   Subjective: "my motion is pretty pain free  The trouble I am having is pain when lifting light objects when I am cooking or around the house "      Objective: See treatment diary below      Assessment: Tolerated treatment well  PROM of the L shoulder WFL  Patient demonstrated fatigue post treatment and would benefit from continued PT      Plan: Continue per plan of care        Precautions: none  Re-evaluation due   Manual  11/20 11/11 11/13 11/15 11/18   L shoulder PROM to jayda x10 mins x15 mins x15 min x15 min x15 min                       Exercise Diary  11/20 11/11 11/13 11/15 11/18   HEP instruction        Pendulums - fwd/back, side/side, cw/ccw All directions  2 mins ea All directions  2 mins ea All directions  2 mins ea All directions  2 mins ea All directions  2 mins ea   Pulleys Flex/scap  10"x10 ea Flex/scap  10"x10 ea Flex/scap  5" x10 ea Flex/scap  10"x10 ea Flex/scap  10"x10 ea   Finger ladder Flex/scap  5"x10 ea Flex/scap  5" x10 ea Flex/scap  5" x10 ea Flex/scap  5"x10 ea Flex/scap  5"x10 ea   Isometric shoulder IR/ER 5" 2x10 ea 5" 2x10 ea 5" 2x10 ea 5" 2x10 ea 5" 2x10 ea   Isometric shoulder flex/ext/abd 5" 2x10 ea Flex/Ext  5" 2x10 ea Flex/Ext 5" 2x10 ea Flex/Ext 5" 2x10 ea Flex/Ext   Scap retractions 5" x20 5" x20 5" x20 5" x20 5" x20   Biceps curls        Supine cane AAROM flexion 5" x20  5" x20 5" x20 5" x20   Supine cane AAROM scaption 5" x20   5" x20 5" x20   Standing cane AAROM extension 5" x20 5" x20 5" x20 5" x20 5" x20   MTP with TB Red TB 5" 2x10    Red TB 5" 2x10   LTP with TB Red TB 5" 2x10    Red TB 5" 2x10 Modalities  11/20 11/11 11/13 11/15 11/18   Stim/CP post tx to L shoulder NP Defers CP to home NP NP NP

## 2019-11-22 ENCOUNTER — OFFICE VISIT (OUTPATIENT)
Dept: PHYSICAL THERAPY | Facility: CLINIC | Age: 39
End: 2019-11-22
Payer: OTHER MISCELLANEOUS

## 2019-11-22 DIAGNOSIS — S49.92XD ACROMIOCLAVICULAR JOINT INJURY, LEFT, SUBSEQUENT ENCOUNTER: ICD-10-CM

## 2019-11-22 DIAGNOSIS — M25.512 ACUTE PAIN OF LEFT SHOULDER: ICD-10-CM

## 2019-11-22 DIAGNOSIS — Z98.890 STATUS POST ARTHROSCOPY OF LEFT SHOULDER: Primary | ICD-10-CM

## 2019-11-22 PROCEDURE — 97140 MANUAL THERAPY 1/> REGIONS: CPT

## 2019-11-22 PROCEDURE — 97110 THERAPEUTIC EXERCISES: CPT

## 2019-11-22 NOTE — PROGRESS NOTES
Daily Note     Today's date: 2019  Patient name: Romero Berumen  : 1980  MRN: [id number]  Referring provider: Citlali Dean MD  Dx:   Encounter Diagnosis     ICD-10-CM    1  Status post arthroscopy of left shoulder Z98 890    2  Acute pain of left shoulder M25 512    3  Acromioclavicular joint injury, left, subsequent encounter S49  92XD                   Subjective: "I feel pretty good  My shoulder gets stiff if I do not use it enough or if I sit for long periods of time "      Objective: See treatment diary below      Assessment: Tolerated treatment well  Initiated multiple prone exercises to increase strengthen and AROM of the L shoulder  PROM of the L shoulder WFL  Patient demonstrated fatigue post treatment and would benefit from continued PT      Plan: Continue per plan of care        Precautions: none  Re-evaluation due   Manual  11/20 11/22 11/13 11/15 11/18   L shoulder PROM to jayda x10 mins x15 mins x15 min x15 min x15 min                       Exercise Diary  11/20 11/22 11/13 11/15 11/18   HEP instruction        Pendulums - fwd/back, side/side, cw/ccw All directions  2 mins ea DC All directions  2 mins ea All directions  2 mins ea All directions  2 mins ea   Pulleys Flex/scap  10"x10 ea Flex/scap  10"x10 ea Flex/scap  5" x10 ea Flex/scap  10"x10 ea Flex/scap  10"x10 ea   Finger ladder Flex/scap  5"x10 ea Flex/scap  5" x10 ea Flex/scap  5" x10 ea Flex/scap  5"x10 ea Flex/scap  5"x10 ea   Isometric shoulder IR/ER 5" 2x10 ea 5" 2x10 ea    TB IR/ER NV 5" 2x10 ea 5" 2x10 ea 5" 2x10 ea   Isometric shoulder flex/ext/abd 5" 2x10 ea Flex/Ext 5" 2x10 ea Flex/Ext 5" 2x10 ea Flex/Ext 5" 2x10 ea Flex/Ext 5" 2x10 ea Flex/Ext   Scap retractions 5" x20 5" x20 5" x20 5" x20 5" x20   Biceps curls        Supine cane AAROM flexion 5" x20 5" x20 5" x20 5" x20 5" x20   Supine cane AAROM scaption 5" x20 5" x20  5" x20 5" x20   Standing cane AAROM extension 5" x20 5" x20 5" x20 5" x20 5" x20   MTP with TB Red TB 5" 2x10 Red TB 5" 2x10   Red TB 5" 2x10   LTP with TB Red TB 5" 2x10 Red TB 5" 2x10   Red TB 5" 2x10   Prone Ext  2x10      Prone Hrz ABD  2x10      Prone Flex  2x10      Prone Row  2x10      UBE  NV          Modalities  11/20 11/22 11/13 11/15 11/18   Stim/CP post tx to L shoulder NP NP NP NP NP

## 2019-11-25 ENCOUNTER — OFFICE VISIT (OUTPATIENT)
Dept: PHYSICAL THERAPY | Facility: CLINIC | Age: 39
End: 2019-11-25
Payer: OTHER MISCELLANEOUS

## 2019-11-25 DIAGNOSIS — S49.92XD ACROMIOCLAVICULAR JOINT INJURY, LEFT, SUBSEQUENT ENCOUNTER: ICD-10-CM

## 2019-11-25 DIAGNOSIS — Z98.890 STATUS POST ARTHROSCOPY OF LEFT SHOULDER: Primary | ICD-10-CM

## 2019-11-25 DIAGNOSIS — M25.512 ACUTE PAIN OF LEFT SHOULDER: ICD-10-CM

## 2019-11-25 PROCEDURE — 97140 MANUAL THERAPY 1/> REGIONS: CPT | Performed by: PHYSICAL THERAPIST

## 2019-11-25 PROCEDURE — 97110 THERAPEUTIC EXERCISES: CPT | Performed by: PHYSICAL THERAPIST

## 2019-11-25 NOTE — PROGRESS NOTES
Daily Note     Today's date: 2019  Patient name: Yuki Jaramillo  : 1980  MRN: [id number]  Referring provider: Juan Cheng MD  Dx:   Encounter Diagnosis     ICD-10-CM    1  Status post arthroscopy of left shoulder Z98 890    2  Acute pain of left shoulder M25 512    3  Acromioclavicular joint injury, left, subsequent encounter S49  92XD                   Subjective: Patient report he just feels "sore" but not painful  Objective: See treatment diary below      Assessment: Tolerated treatment well  Good tolerance to newly added exercises  Patient demonstrated fatigue post treatment, exhibited good technique with therapeutic exercises and would benefit from continued PT      Plan: Continue per plan of care  Progress treatment as tolerated         Precautions: none  Re-evaluation due   Manual  11/20 11/22 11/25 11/15 11/18   L shoulder PROM to jayda x10 mins x15 mins x15 min x15 min x15 min                       Exercise Diary  11/20 11/22 11/25 11/15 11/18   HEP instruction        Pendulums - fwd/back, side/side, cw/ccw All directions  2 mins ea DC -- All directions  2 mins ea All directions  2 mins ea   Pulleys Flex/scap  10"x10 ea Flex/scap  10"x10 ea Flex/scap  5" x10 ea Flex/scap  10"x10 ea Flex/scap  10"x10 ea   Finger ladder Flex/scap  5"x10 ea Flex/scap  5" x10 ea Flex/scap  5" x10 ea Flex/scap  5"x10 ea Flex/scap  5"x10 ea   Isometric shoulder IR/ER 5" 2x10 ea 5" 2x10 ea    TB IR/ER NV Yellow TB IR/ER 2x10 ea 5" 2x10 ea 5" 2x10 ea   Isometric shoulder flex/ext/abd 5" 2x10 ea Flex/Ext 5" 2x10 ea Flex/Ext DC 5" 2x10 ea Flex/Ext 5" 2x10 ea Flex/Ext   Scap retractions 5" x20 5" x20 DC 5" x20 5" x20   Biceps curls        Supine cane AAROM flexion 5" x20 5" x20 DC 5" x20 5" x20   Supine cane AAROM scaption 5" x20 5" x20 DC 5" x20 5" x20   Standing cane AAROM extension 5" x20 5" x20 DC 5" x20 5" x20   MTP with TB Red TB 5" 2x10 Red TB 5" 2x10 Red TB 5" 2x10  Red TB 5" 2x10   LTP with TB Red TB 5" 2x10 Red TB 5" 2x10 Red TB 5" 2x10  Red TB 5" 2x10   Prone Ext  2x10 0# 2x10     Prone Hrz ABD  2x10 0# 2x10     Prone Flex  2x10 0# 2x10     Prone Row  2x10 0# 2x10     UBE   rpm 2  5'fwd/2  5'retro     Standing b/l shoulder flexion to 90*   0# 2x10     Standing b/l shoulder scaption to 90*   0# 2x10     Rhythmic stabilization - supine at 90* shoulder flexion   15" x4     Scap punches   0# 2x10, 5"      Scap ABCs   0# x1     Flexbar "[address] [place] [address] Road of [address] [place] [address] Place"

## 2019-11-29 ENCOUNTER — OFFICE VISIT (OUTPATIENT)
Dept: PHYSICAL THERAPY | Facility: CLINIC | Age: 39
End: 2019-11-29
Payer: OTHER MISCELLANEOUS

## 2019-11-29 DIAGNOSIS — S49.92XD ACROMIOCLAVICULAR JOINT INJURY, LEFT, SUBSEQUENT ENCOUNTER: ICD-10-CM

## 2019-11-29 DIAGNOSIS — Z98.890 STATUS POST ARTHROSCOPY OF LEFT SHOULDER: Primary | ICD-10-CM

## 2019-11-29 DIAGNOSIS — M25.512 ACUTE PAIN OF LEFT SHOULDER: ICD-10-CM

## 2019-11-29 PROCEDURE — 97140 MANUAL THERAPY 1/> REGIONS: CPT

## 2019-11-29 PROCEDURE — 97110 THERAPEUTIC EXERCISES: CPT

## 2019-11-29 NOTE — PROGRESS NOTES
Daily Note     Today's date: 2019  Patient name: Pam Saunders  : 1980  MRN: [id number]  Referring provider: Marla Singer MD  Dx:   Encounter Diagnosis     ICD-10-CM    1  Status post arthroscopy of left shoulder Z98 890    2  Acute pain of left shoulder M25 512    3  Acromioclavicular joint injury, left, subsequent encounter S49  92XD                   Subjective: "I feel okay  My shoulder is stiff  I used it a lot to cook and clean up yesterday for Thanksgiving "      Objective: See treatment diary below      Assessment: Tolerated treatment well  Progressed TB resistance during multiple exercises to increase UE strength  Patient demonstrated fatigue post treatment and would benefit from continued PT      Plan: Continue per plan of care        Precautions: none  Re-evaluation due   Manual     L shoulder PROM to jayda x10 mins x15 mins x15 min x15 min x15 min                       Exercise Diary     HEP instruction        Pendulums - fwd/back, side/side, cw/ccw All directions  2 mins ea DC -- -- All directions  2 mins ea   Pulleys Flex/scap  10"x10 ea Flex/scap  10"x10 ea Flex/scap  5" x10 ea Flex/scap  10"x10 ea Flex/scap  10"x10 ea   Finger ladder Flex/scap  5"x10 ea Flex/scap  5" x10 ea Flex/scap  5" x10 ea Flex/scap  5"x10 ea Flex/scap  5"x10 ea   Isometric shoulder IR/ER 5" 2x10 ea 5" 2x10 ea    TB IR/ER NV Yellow TB IR/ER 2x10 ea Red TB IR/ER 2x10 ea 5" 2x10 ea   Isometric shoulder flex/ext/abd 5" 2x10 ea Flex/Ext 5" 2x10 ea Flex/Ext DC -- 5" 2x10 ea Flex/Ext   Scap retractions 5" x20 5" x20 DC -- 5" x20   Biceps curls        Supine cane AAROM flexion 5" x20 5" x20 DC -- 5" x20   Supine cane AAROM scaption 5" x20 5" x20 DC -- 5" x20   Standing cane AAROM extension 5" x20 5" x20 --  5" x20   MTP with TB Red TB 5" 2x10 Red TB 5" 2x10 Red TB 5" 2x10 Green TB 5" 2x10 Red TB 5" 2x10   LTP with TB Red TB 5" 2x10 Red TB 5" 2x10 Red TB 5" 2x10 Green TB 5" 2x10 Red TB 5" 2x10   Prone Ext  2x10 0# 2x10 0# 2x10    Prone Hrz ABD  2x10 0# 2x10 0# 2x10    Prone Flex  2x10 0# 2x10 0# 2x10    Prone Row  2x10 0# 2x10 0# 2x10    UBE   rpm 2  5'fwd/2  5'retro 120 rpm 2  5'fwd/2  5'retro    Standing b/l shoulder flexion to 90*   0# 2x10 0# 2x10    Standing b/l shoulder scaption to 90*   0# 2x10 0# 2x10    Rhythmic stabilization - supine at 90* shoulder flexion   15" x4 15" x4    Scap punches   0# 2x10, 5"  0# 2x10, 5"    Scap ABCs   0# x1 0# x1    Flexbar "[address] [place] [address] [address]"

## 2019-12-02 ENCOUNTER — APPOINTMENT (OUTPATIENT)
Dept: PHYSICAL THERAPY | Facility: CLINIC | Age: 39
End: 2019-12-02
Payer: OTHER MISCELLANEOUS

## 2019-12-03 ENCOUNTER — OFFICE VISIT (OUTPATIENT)
Dept: OBGYN CLINIC | Facility: CLINIC | Age: 39
End: 2019-12-03

## 2019-12-03 ENCOUNTER — APPOINTMENT (OUTPATIENT)
Dept: RADIOLOGY | Facility: MEDICAL CENTER | Age: 39
End: 2019-12-03
Payer: COMMERCIAL

## 2019-12-03 VITALS
HEIGHT: 67 IN | SYSTOLIC BLOOD PRESSURE: 132 MMHG | BODY MASS INDEX: 26.68 KG/M2 | WEIGHT: 170 LBS | DIASTOLIC BLOOD PRESSURE: 88 MMHG | HEART RATE: 71 BPM

## 2019-12-03 DIAGNOSIS — Z98.890 STATUS POST ARTHROSCOPY OF LEFT SHOULDER: Primary | ICD-10-CM

## 2019-12-03 DIAGNOSIS — Z98.890 STATUS POST ARTHROSCOPY OF LEFT SHOULDER: ICD-10-CM

## 2019-12-03 PROCEDURE — 99024 POSTOP FOLLOW-UP VISIT: CPT | Performed by: ORTHOPAEDIC SURGERY

## 2019-12-03 PROCEDURE — 73030 X-RAY EXAM OF SHOULDER: CPT

## 2019-12-03 RX ORDER — NAPROXEN SODIUM 220 MG
220 TABLET ORAL 2 TIMES DAILY WITH MEALS
COMMUNITY

## 2019-12-03 NOTE — LETTER
December 3, 2019     Patient: Eleazar Thorne   YOB: 1980   Date of Visit: 12/3/2019       To Whom it May Concern:    Eleazar Thorne is under my professional care  He was seen in my office on 12/3/2019  He remains out of work until his next evaluation in four weeks  If you have any questions or concerns, please don't hesitate to call           Sincerely,          Jordyn Catherine PA-C

## 2019-12-03 NOTE — PROGRESS NOTES
Patient Name:  Oleg Esquivel  MRN:  [id number]    Assessment     1  Status post arthroscopy of left shoulder  XR shoulder 2+ vw left       Plan     Left shoulder arthroscopy with subacromial decompression, synovectomy and distal clavicle excision 10/31/19  1  Continue physical therapy with emphasis on strengthening  2  Gradually return to normal activities as tolerated  3  Patient remains out of work  Work note provided  4  Follow-up in four weeks for repeat evaluation  Consider release to full duty at that time as indicated  Subjective     40-year-old male returns to the office today for follow-up status post left shoulder arthroscopy with subacromial decompression, synovectomy, and distal clavicle excision performed 10/31/19  He does note overall improvement  He still notes minimal discomfort in the shoulder  He also notes persistent weakness  He is performing physical therapy  He denies instability  No numbness or tingling  No fevers or chills  Objective     /88   Pulse 71   Ht 5' 7" (1 702 m)   Wt 77 1 kg (170 lb)   BMI 26 63 kg/m²     Left Shoulder:  No gross deformity  No tenderness to palpation distal clavicle and AC joint  Skin intact without erythema ecchymosis or swelling  Passive range of motion is intact and full in all planes without discomfort  Negative empty can and speed's test   Negative cross-body adduction test   Sensation intact axillary, median, ulnar and radial nerves  2+ radial pulse  Data Review     I have personally reviewed pertinent films in PACS, and my interpretation follows  X-rays performed today of the left shoulder reveals no acute osseous abnormalities  Evidence of distal clavicle excision is noted        Scribe Attestation    I,:   Gale Foster PA-C am acting as a scribe while in the presence of the attending physician :        I,:   Marla Merida MD personally performed the services described in this documentation    as scribed in my presence :

## 2019-12-04 ENCOUNTER — OFFICE VISIT (OUTPATIENT)
Dept: PHYSICAL THERAPY | Facility: CLINIC | Age: 39
End: 2019-12-04
Payer: OTHER MISCELLANEOUS

## 2019-12-04 DIAGNOSIS — M25.512 ACUTE PAIN OF LEFT SHOULDER: ICD-10-CM

## 2019-12-04 DIAGNOSIS — S49.92XD ACROMIOCLAVICULAR JOINT INJURY, LEFT, SUBSEQUENT ENCOUNTER: ICD-10-CM

## 2019-12-04 DIAGNOSIS — Z98.890 STATUS POST ARTHROSCOPY OF LEFT SHOULDER: Primary | ICD-10-CM

## 2019-12-04 PROCEDURE — 97110 THERAPEUTIC EXERCISES: CPT

## 2019-12-04 PROCEDURE — 97140 MANUAL THERAPY 1/> REGIONS: CPT

## 2019-12-04 NOTE — PROGRESS NOTES
Daily Note     Today's date: 2019  Patient name: Cortes Castillo  : 1980  MRN: [id number]  Referring provider: Linda Falcon MD  Dx:   Encounter Diagnosis     ICD-10-CM    1  Status post arthroscopy of left shoulder Z98 890    2  Acute pain of left shoulder M25 512    3  Acromioclavicular joint injury, left, subsequent encounter S49  92XD                   Subjective: "I still have a hard time lowering my arm after performing activity overhead "      Objective: See treatment diary below      Assessment: Tolerated treatment well  Progressed to weight during multiple exercises to increase LUE strength  Initiated ano overhead dynamic stability exercise with the flexbar; no exacerbation of symptoms  PROM of the L shoulder WNL  Patient demonstrated fatigue post treatment and would benefit from continued PT      Plan: Continue per plan of care        Precautions: none  Re-evaluation due   Manual     L shoulder PROM to jayda x10 mins x15 mins x15 min x15 min x10 min                       Exercise Diary     HEP instruction        Pendulums - fwd/back, side/side, cw/ccw All directions  2 mins ea DC -- -- --   Pulleys Flex/scap  10"x10 ea Flex/scap  10"x10 ea Flex/scap  5" x10 ea Flex/scap  10"x10 ea Flex/scap  10"x10 ea   Finger ladder Flex/scap  5"x10 ea Flex/scap  5" x10 ea Flex/scap  5" x10 ea Flex/scap  5"x10 ea Flex/scap  5"x10 ea   Isometric shoulder IR/ER 5" 2x10 ea 5" 2x10 ea    TB IR/ER NV Yellow TB IR/ER 2x10 ea Red TB IR/ER 2x10 ea Red TB IR/ER 2x10 ea   Isometric shoulder flex/ext/abd 5" 2x10 ea Flex/Ext 5" 2x10 ea Flex/Ext DC -- --   Scap retractions 5" x20 5" x20 DC -- --   Biceps curls        Supine cane AAROM flexion 5" x20 5" x20 DC -- --   Supine cane AAROM scaption 5" x20 5" x20 DC -- --   Standing cane AAROM extension 5" x20 5" x20 --     MTP with TB Red TB 5" 2x10 Red TB 5" 2x10 Red TB 5" 2x10 Green TB 5" 2x10 Green TB 5" 2x10   LTP with TB Red TB 5" 2x10 Red TB 5" 2x10 Red TB 5" 2x10 Green TB 5" 2x10 Green TB 5" 2x10   Prone Ext  2x10 0# 2x10 0# 2x10 1# 2x10   Prone Hrz ABD  2x10 0# 2x10 0# 2x10 1# 2x10   Prone Flex  2x10 0# 2x10 0# 2x10 1# 2x10   Prone Row  2x10 0# 2x10 0# 2x10 1# 2x10   UBE   rpm 2  5'fwd/2  5'retro 120 rpm 2  5'fwd/2  5'retro 90 rpm 2  5'fwd/2  5'retro   Standing b/l shoulder flexion to 90*   0# 2x10 0# 2x10 1# 2x10   Standing b/l shoulder scaption to 90*   0# 2x10 0# 2x10 1# 2x10   Rhythmic stabilization - supine at 90* shoulder flexion   15" x4 15" x4 15" x4   Scap punches   0# 2x10, 5"  0# 2x10, 5" 1# 2x10, 5"   Scap ABCs   0# x1 0# x1 1# x1   Flexbar "[facility]"     Red FlexBar 15"x4

## 2019-12-05 ENCOUNTER — OFFICE VISIT (OUTPATIENT)
Dept: PHYSICAL THERAPY | Facility: CLINIC | Age: 39
End: 2019-12-05
Payer: OTHER MISCELLANEOUS

## 2019-12-05 DIAGNOSIS — Z98.890 STATUS POST ARTHROSCOPY OF LEFT SHOULDER: Primary | ICD-10-CM

## 2019-12-05 DIAGNOSIS — S49.92XD ACROMIOCLAVICULAR JOINT INJURY, LEFT, SUBSEQUENT ENCOUNTER: ICD-10-CM

## 2019-12-05 DIAGNOSIS — M25.512 ACUTE PAIN OF LEFT SHOULDER: ICD-10-CM

## 2019-12-05 PROCEDURE — 97110 THERAPEUTIC EXERCISES: CPT

## 2019-12-05 PROCEDURE — 97140 MANUAL THERAPY 1/> REGIONS: CPT

## 2019-12-05 NOTE — PROGRESS NOTES
Daily Note     Today's date: 2019  Patient name: Braulio Pedroza  : 1980  MRN: [id number]  Referring provider: Araceli Arriaza MD  Dx:   Encounter Diagnosis     ICD-10-CM    1  Status post arthroscopy of left shoulder Z98 890    2  Acute pain of left shoulder M25 512    3  Acromioclavicular joint injury, left, subsequent encounter S49  92XD                   Subjective: "I am a little sore after yesterdays Tx "       Objective: See treatment diary below      Assessment: Tolerated treatment well  Increased time on the UBE to gain endurance of the LUE  PROM of the L shoulder WFL  Patient demonstrated fatigue post treatment and would benefit from continued PT      Plan: Continue per plan of care        Precautions: none  Re-evaluation due   Manual     L shoulder PROM to jayda x10 mins x15 mins x15 min x15 min x10 min                       Exercise Diary     HEP instruction        Pendulums - fwd/back, side/side, cw/ccw -- DC -- -- --   Pulleys Flex/scap  10"x10 ea Flex/scap  10"x10 ea Flex/scap  5" x10 ea Flex/scap  10"x10 ea Flex/scap  10"x10 ea   Finger ladder Flex/scap  5"x10 ea Flex/scap  5" x10 ea Flex/scap  5" x10 ea Flex/scap  5"x10 ea Flex/scap  5"x10 ea   Isometric shoulder IR/ER Red TB IR/ER 2x10 ea 5" 2x10 ea    TB IR/ER NV Yellow TB IR/ER 2x10 ea Red TB IR/ER 2x10 ea Red TB IR/ER 2x10 ea   Isometric shoulder flex/ext/abd  5" 2x10 ea Flex/Ext DC -- --   Scap retractions  5" x20 DC -- --   Biceps curls        Supine cane AAROM flexion  5" x20 DC -- --   Supine cane AAROM scaption  5" x20 DC -- --   Standing cane AAROM extension  5" x20 --     MTP with TB Green TB 5" 2x10 Red TB 5" 2x10 Red TB 5" 2x10 Green TB 5" 2x10 Green TB 5" 2x10   LTP with TB Green TB 5" 2x10 Red TB 5" 2x10 Red TB 5" 2x10 Green TB 5" 2x10 Green TB 5" 2x10   Prone Ext 1# 2x10 2x10 0# 2x10 0# 2x10 1# 2x10   Prone Hrz ABD 1# 2x10 2x10 0# 2x10 0# 2x10 1# 2x10   Prone Flex 1# 2x10 2x10 0# 2x10 0# 2x10 1# 2x10   Prone Row 1# 2x10 2x10 0# 2x10 0# 2x10 1# 2x10   UBE 90 rpm 5'fwd/5'retro  rpm 2  5'fwd/2  5'retro 120 rpm 2  5'fwd/2  5'retro 90 rpm 2  5'fwd/2  5'retro   Standing b/l shoulder flexion to 90* 1# 2x10  0# 2x10 0# 2x10 1# 2x10   Standing b/l shoulder scaption to 90* 1# 2x10  0# 2x10 0# 2x10 1# 2x10   Rhythmic stabilization - supine at 90* shoulder flexion 15" x4  15" x4 15" x4 15" x4   Scap punches 1# 2x10, 5"  0# 2x10, 5"  0# 2x10, 5" 1# 2x10, 5"   Scap ABCs 1# x1  0# x1 0# x1 1# x1   Flexbar "[facility]" Red FlexBar 15"x4    Red FlexBar 15"x4

## 2019-12-09 ENCOUNTER — EVALUATION (OUTPATIENT)
Dept: PHYSICAL THERAPY | Facility: CLINIC | Age: 39
End: 2019-12-09
Payer: OTHER MISCELLANEOUS

## 2019-12-09 DIAGNOSIS — M25.512 ACUTE PAIN OF LEFT SHOULDER: ICD-10-CM

## 2019-12-09 DIAGNOSIS — Z98.890 STATUS POST ARTHROSCOPY OF LEFT SHOULDER: Primary | ICD-10-CM

## 2019-12-09 DIAGNOSIS — S49.92XD ACROMIOCLAVICULAR JOINT INJURY, LEFT, SUBSEQUENT ENCOUNTER: ICD-10-CM

## 2019-12-09 PROCEDURE — 97110 THERAPEUTIC EXERCISES: CPT | Performed by: PHYSICAL THERAPIST

## 2019-12-09 PROCEDURE — 97140 MANUAL THERAPY 1/> REGIONS: CPT | Performed by: PHYSICAL THERAPIST

## 2019-12-11 ENCOUNTER — OFFICE VISIT (OUTPATIENT)
Dept: PHYSICAL THERAPY | Facility: CLINIC | Age: 39
End: 2019-12-11
Payer: OTHER MISCELLANEOUS

## 2019-12-11 DIAGNOSIS — S49.92XD ACROMIOCLAVICULAR JOINT INJURY, LEFT, SUBSEQUENT ENCOUNTER: ICD-10-CM

## 2019-12-11 DIAGNOSIS — Z98.890 STATUS POST ARTHROSCOPY OF LEFT SHOULDER: Primary | ICD-10-CM

## 2019-12-11 DIAGNOSIS — M25.512 ACUTE PAIN OF LEFT SHOULDER: ICD-10-CM

## 2019-12-11 PROCEDURE — 97140 MANUAL THERAPY 1/> REGIONS: CPT

## 2019-12-11 PROCEDURE — 97110 THERAPEUTIC EXERCISES: CPT

## 2019-12-11 NOTE — PROGRESS NOTES
Daily Note     Today's date: 2019  Patient name: Scar Sethi  : 1980  MRN: [id number]  Referring provider: Sydnee Thomas MD  Dx:   Encounter Diagnosis     ICD-10-CM    1  Status post arthroscopy of left shoulder Z98 890    2  Acromioclavicular joint injury, left, subsequent encounter S49  92XD    3  Acute pain of left shoulder M25 512                   Subjective: "My shoulder is pretty sore today  I felt the soreness in my shoulder even when putting on my socks today "       Objective: See treatment diary below      Assessment: Tolerated treatment well  Increased multiple exercise in weight and TB resistance to strengthen the L shoulder  PROM of the L WFL, slightly limited in IR  Patient demonstrated fatigue post treatment and would benefit from continued PT      Plan: Continue per plan of care  Precautions: none  Re-cert due   Manual     L shoulder PROM to jayda x10 mins x10 mins    Focus on IR at future visits x10 mins    Focus on IR x15 min x10 min                       Exercise Diary     HEP instruction        Pulleys Flex/scap  10"x10 ea Flex/scap  10"x10 ea Flex/scap  10" x10 ea Flex/scap  10"x10 ea Flex/scap  10"x10 ea   Finger ladder Flex/scap  5"x10 ea DC -- Flex/scap  5"x10 ea Flex/scap  5"x10 ea   Isometric shoulder IR/ER Red TB IR/ER 2x10 ea Resume NV Green TB IR/ER 2x10 ea Red TB IR/ER 2x10 ea Red TB IR/ER 2x10 ea   MTP with TB Green TB 5" 2x10 Resume NV Blue TB 5" 2x10 Green TB 5" 2x10 Green TB 5" 2x10   LTP with TB Green TB 5" 2x10 Resume NV Blue TB 5" 2x10 Green TB 5" 2x10 Green TB 5" 2x10   Prone Ext 1# 2x10 Resume NV 2# 2x10 0# 2x10 1# 2x10   Prone Hrz ABD 1# 2x10 Resume NV 2# 2x10 0# 2x10 1# 2x10   Prone Flex 1# 2x10 Resume NV 2# 2x10 0# 2x10 1# 2x10   Prone Row 1# 2x10 Resume NV 2# 2x10 0# 2x10 1# 2x10   UBE 90 rpm 5'fwd/5'retro 90 rpm 5'fwd/5'retro 90 rpm 5'fwd/5'retro 120 rpm 2  5'fwd/2  5'retro 90 rpm 2 5'fwd/2  5'retro   Standing b/l shoulder flexion to 90* 1# 2x10 2# 2x10 2# 2x10 0# 2x10 1# 2x10   Standing b/l shoulder scaption to 90* 1# 2x10 2# 2x10 2# 2x10 0# 2x10 1# 2x10   Rhythmic stabilization - supine at 90* shoulder flexion 15" x4 DC  15" x4 15" x4   Rhythmic stabilization - supine shoulder flex , horiz abd 0-30 deg  1# x20 ea 2# x20 ea     Scap punches 1# 2x10, 5" 2# x20 2# 2x10, 5"  0# 2x10, 5" 1# 2x10, 5"   Scap ABCs 1# x1 2# x1 2# x1 0# x1 1# x1   Flexbar "[place]" Red FlexBar 15"x4 Red FlexBar 15"x4 Red FlexBar 15"x4  Red FlexBar 15"x4   Rhythmic stabilization: Ball roll against wall  Green ball  4 directions  20x ea Green ball  4 directions  20x ea     UT stretch w/TB  Silver  5x10" Silver  5x10"

## 2019-12-12 ENCOUNTER — OFFICE VISIT (OUTPATIENT)
Dept: PHYSICAL THERAPY | Facility: CLINIC | Age: 39
End: 2019-12-12
Payer: OTHER MISCELLANEOUS

## 2019-12-12 DIAGNOSIS — Z98.890 STATUS POST ARTHROSCOPY OF LEFT SHOULDER: Primary | ICD-10-CM

## 2019-12-12 DIAGNOSIS — M25.512 ACUTE PAIN OF LEFT SHOULDER: ICD-10-CM

## 2019-12-12 DIAGNOSIS — S49.92XD ACROMIOCLAVICULAR JOINT INJURY, LEFT, SUBSEQUENT ENCOUNTER: ICD-10-CM

## 2019-12-12 PROCEDURE — 97110 THERAPEUTIC EXERCISES: CPT

## 2019-12-12 PROCEDURE — 97140 MANUAL THERAPY 1/> REGIONS: CPT

## 2019-12-12 NOTE — PROGRESS NOTES
Daily Note     Today's date: 2019  Patient name: Edmundo Ohara  : 1980  MRN: [id number]  Referring provider: Ursula Xiong MD  Dx:   Encounter Diagnosis     ICD-10-CM    1  Status post arthroscopy of left shoulder Z98 890    2  Acromioclavicular joint injury, left, subsequent encounter S49  92XD    3  Acute pain of left shoulder M25 512                   Subjective: "I feel okay today " Pt denies L shoulder pain at rest        Objective: See treatment diary below      Assessment: Tolerated treatment well  PROM of the L shoulder WFL  Focused on gaining dynamic stability of the L shoulder  Patient demonstrated fatigue post treatment and would benefit from continued PT      Plan: Continue per plan of care  Precautions: none  Re-cert due 31  Manual     L shoulder PROM to jayda x10 mins x10 mins    Focus on IR at future visits x10 mins    Focus on IR x10 mins    Focus on IR x10 min                       Exercise Diary     HEP instruction        Pulleys Flex/scap  10"x10 ea Flex/scap  10"x10 ea Flex/scap  10" x10 ea Flex/scap  10"x10 ea Flex/scap  10"x10 ea   Finger ladder Flex/scap  5"x10 ea DC -- -- Flex/scap  5"x10 ea   Isometric shoulder IR/ER Red TB IR/ER 2x10 ea Resume NV Green TB IR/ER 2x10 ea Green TB IR/ER 2x10 ea Red TB IR/ER 2x10 ea   MTP with TB Green TB 5" 2x10 Resume NV Blue TB 5" 2x10 Blue TB 5" 2x10 Green TB 5" 2x10   LTP with TB Green TB 5" 2x10 Resume NV Blue TB 5" 2x10 Blue TB 5" 2x10 Green TB 5" 2x10   Prone Ext 1# 2x10 Resume NV 2# 2x10 2# 2x10 1# 2x10   Prone Hrz ABD 1# 2x10 Resume NV 2# 2x10 2# 2x10 1# 2x10   Prone Flex 1# 2x10 Resume NV 2# 2x10 2# 2x10 1# 2x10   Prone Row 1# 2x10 Resume NV 2# 2x10 2# 2x10 1# 2x10   UBE 90 rpm 5'fwd/5'retro 90 rpm 5'fwd/5'retro 90 rpm 5'fwd/5'retro 90 rpm 5'fwd/5'retro 90 rpm 2  5'fwd/2  5'retro   Standing b/l shoulder flexion to 90* 1# 2x10 2# 2x10 2# 2x10 2# 2x10 1# 2x10   Standing b/l shoulder scaption to 90* 1# 2x10 2# 2x10 2# 2x10 2# 2x10 1# 2x10   Rhythmic stabilization - supine at 90* shoulder flexion 15" x4 DC  15" x4 15" x4   Rhythmic stabilization - supine shoulder flex , horiz abd 0-30 deg  1# x20 ea 2# x20 ea 2# x20 ea    Scap punches 1# 2x10, 5" 2# x20 2# 2x10, 5"  2# 2x10, 5" 1# 2x10, 5"   Scap ABCs 1# x1 2# x1 2# x1 2# x1 1# x1   Flexbar "[place]" Red FlexBar 15"x4 Red FlexBar 15"x4 Red FlexBar 15"x4 Red FlexBar 15"x4 Red FlexBar 15"x4   Rhythmic stabilization: Ball roll against wall  Green ball  4 directions  20x ea Green ball  4 directions  20x ea Green ball  4 directions  20x ea    UT stretch w/TB  Silver  5x10" Silver  5x10" Silver  5x10"

## 2019-12-13 ENCOUNTER — APPOINTMENT (OUTPATIENT)
Dept: PHYSICAL THERAPY | Facility: CLINIC | Age: 39
End: 2019-12-13
Payer: OTHER MISCELLANEOUS

## 2019-12-16 ENCOUNTER — OFFICE VISIT (OUTPATIENT)
Dept: PHYSICAL THERAPY | Facility: CLINIC | Age: 39
End: 2019-12-16
Payer: OTHER MISCELLANEOUS

## 2019-12-16 DIAGNOSIS — Z98.890 STATUS POST ARTHROSCOPY OF LEFT SHOULDER: Primary | ICD-10-CM

## 2019-12-16 DIAGNOSIS — M25.512 ACUTE PAIN OF LEFT SHOULDER: ICD-10-CM

## 2019-12-16 DIAGNOSIS — S49.92XD ACROMIOCLAVICULAR JOINT INJURY, LEFT, SUBSEQUENT ENCOUNTER: ICD-10-CM

## 2019-12-16 PROCEDURE — 97110 THERAPEUTIC EXERCISES: CPT

## 2019-12-16 NOTE — PROGRESS NOTES
Daily Note     Today's date: 2019  Patient name: Nick Wade  : 1980  MRN: [id number]  Referring provider: Abby Oh MD  Dx:   Encounter Diagnosis     ICD-10-CM    1  Status post arthroscopy of left shoulder Z98 890    2  Acromioclavicular joint injury, left, subsequent encounter S49  92XD    3  Acute pain of left shoulder M25 512                   Subjective: "I have started lifting heaver objects at home  I am able to do it but I can feel some soreness of the L shoulder after "      Objective: See treatment diary below      Assessment: Tolerated treatment well  IR remains slightly limited during PROM of the L shoulder  Progressed weight during standing exercises to increase R shoulder strength  Initiated the Body Blade to increase dynamic stability if the L shoulder  Patient demonstrated fatigue post treatment and would benefit from continued PT      Plan: Continue per plan of care        Precautions: none  Re-cert due 89  Manual     L shoulder PROM to jayda x10 mins x10 mins    Focus on IR at future visits x10 mins    Focus on IR x10 mins    Focus on IR x5 mins    IR only this date                       Exercise Diary     HEP instruction        Pulleys Flex/scap  10"x10 ea Flex/scap  10"x10 ea Flex/scap  10" x10 ea Flex/scap  10"x10 ea Flex/scap  10"x10 ea   Finger ladder Flex/scap  5"x10 ea DC -- --    Isometric shoulder IR/ER Red TB IR/ER 2x10 ea Resume NV Green TB IR/ER 2x10 ea Green TB IR/ER 2x10 ea Green TB IR/ER 2x10 ea   MTP with TB Green TB 5" 2x10 Resume NV Blue TB 5" 2x10 Blue TB 5" 2x10 Blue TB 5" 2x10   LTP with TB Green TB 5" 2x10 Resume NV Blue TB 5" 2x10 Blue TB 5" 2x10 Blue TB 5" 2x10   Prone Ext 1# 2x10 Resume NV 2# 2x10 2# 2x10 2# 2x10   Prone Hrz ABD 1# 2x10 Resume NV 2# 2x10 2# 2x10 2# 2x10   Prone Flex 1# 2x10 Resume NV 2# 2x10 2# 2x10 2# 2x10   Prone Row 1# 2x10 Resume NV 2# 2x10 2# 2x10 2# 2x10   UBE 90 rpm 5'fwd/5'retro 90 rpm 5'fwd/5'retro 90 rpm 5'fwd/5'retro 90 rpm 5'fwd/5'retro 80 rpm 5'fwd/5'retro   Standing b/l shoulder flexion to 90* 1# 2x10 2# 2x10 2# 2x10 2# 2x10 3# 2x10   Standing b/l shoulder scaption to 90* 1# 2x10 2# 2x10 2# 2x10 2# 2x10 3# 2x10   Rhythmic stabilization - supine at 90* shoulder flexion 15" x4 DC  15" x4 15" x4   Rhythmic stabilization - supine shoulder flex , horiz abd 0-30 deg  1# x20 ea 2# x20 ea 2# x20 ea 2# x20 ea   Scap punches 1# 2x10, 5" 2# x20 2# 2x10, 5"  2# 2x10, 5" 2# 2x10, 5"   Scap ABCs 1# x1 2# x1 2# x1 2# x1 2# x1   Flexbar "[place]" Red FlexBar 15"x4 Red FlexBar 15"x4 Red FlexBar 15"x4 Red FlexBar 15"x4 Red FlexBar 15"x4   Rhythmic stabilization: Ball roll against wall  Green ball  4 directions  20x ea Green ball  4 directions  20x ea Green ball  4 directions  20x ea Green ball  4 directions  20x ea   UT stretch w/TB  Silver  5x10" Silver  5x10" Silver  5x10" Silver  5x10"   Body Blade     3 ways   15" x4 ea

## 2019-12-18 ENCOUNTER — OFFICE VISIT (OUTPATIENT)
Dept: PHYSICAL THERAPY | Facility: CLINIC | Age: 39
End: 2019-12-18
Payer: OTHER MISCELLANEOUS

## 2019-12-18 DIAGNOSIS — M25.512 ACUTE PAIN OF LEFT SHOULDER: ICD-10-CM

## 2019-12-18 DIAGNOSIS — Z98.890 STATUS POST ARTHROSCOPY OF LEFT SHOULDER: Primary | ICD-10-CM

## 2019-12-18 DIAGNOSIS — S49.92XD ACROMIOCLAVICULAR JOINT INJURY, LEFT, SUBSEQUENT ENCOUNTER: ICD-10-CM

## 2019-12-18 PROCEDURE — 97110 THERAPEUTIC EXERCISES: CPT

## 2019-12-18 NOTE — PROGRESS NOTES
Daily Note     Today's date: 2019  Patient name: Inocente Olsen  : 1980  MRN: [id number]  Referring provider: Hari Wilcox MD  Dx:   Encounter Diagnosis     ICD-10-CM    1  Status post arthroscopy of left shoulder Z98 890    2  Acromioclavicular joint injury, left, subsequent encounter S49  92XD    3  Acute pain of left shoulder M25 512                   Subjective: "My shoulder is feeling better today  I am noticing increased strength of the L arm "      Objective: See treatment diary below      Assessment: Tolerated treatment well  Pt continues to demonstrate increased LUE strength  No exacerbation of L shoulder pain this date  Patient demonstrated fatigue post treatment and would benefit from continued PT      Plan: Continue per plan of care        Precautions: none  Re-cert due 18  Manual     L shoulder PROM to jayda x5 mins    IR only this date x10 mins    Focus on IR at future visits x10 mins    Focus on IR x10 mins    Focus on IR x5 mins    IR only this date                       Exercise Diary     HEP instruction        Pulleys Flex/scap  10"x10 ea Flex/scap  10"x10 ea Flex/scap  10" x10 ea Flex/scap  10"x10 ea Flex/scap  10"x10 ea   Finger ladder -- DC -- --    Isometric shoulder IR/ER Green TB IR/ER 2x10 ea Resume NV Green TB IR/ER 2x10 ea Green TB IR/ER 2x10 ea Green TB IR/ER 2x10 ea   MTP with TB Blue TB 5" 2x10 Resume NV Blue TB 5" 2x10 Blue TB 5" 2x10 Blue TB 5" 2x10   LTP with TB Blue TB 5" 2x10 Resume NV Blue TB 5" 2x10 Blue TB 5" 2x10 Blue TB 5" 2x10   Prone Ext 2# 2x10 Resume NV 2# 2x10 2# 2x10 2# 2x10   Prone Hrz ABD 2# 2x10 Resume NV 2# 2x10 2# 2x10 2# 2x10   Prone Flex 2# 2x10 Resume NV 2# 2x10 2# 2x10 2# 2x10   Prone Row 2# 2x10 Resume NV 2# 2x10 2# 2x10 2# 2x10   UBE 80 rpm 5'fwd/5'retro 90 rpm 5'fwd/5'retro 90 rpm 5'fwd/5'retro 90 rpm 5'fwd/5'retro 80 rpm 5'fwd/5'retro   Standing b/l shoulder flexion to 90* 3# 2x10 2# 2x10 2# 2x10 2# 2x10 3# 2x10   Standing b/l shoulder scaption to 90* 3# 2x10 2# 2x10 2# 2x10 2# 2x10 3# 2x10   Rhythmic stabilization - supine at 90* shoulder flexion 15" x4 DC  15" x4 15" x4   Rhythmic stabilization - supine shoulder flex , horiz abd 0-30 deg 2# x20 ea 1# x20 ea 2# x20 ea 2# x20 ea 2# x20 ea   Scap punches 1# 2x10, 5" 2# x20 2# 2x10, 5"  2# 2x10, 5" 2# 2x10, 5"   Scap ABCs 1# x1 2# x1 2# x1 2# x1 2# x1   Flexbar "[place]" Red FlexBar 15"x4 Red FlexBar 15"x4 Red FlexBar 15"x4 Red FlexBar 15"x4 Red FlexBar 15"x4   Rhythmic stabilization: Ball roll against wall Green ball  4 directions  20x ea Green ball  4 directions  20x ea Green ball  4 directions  20x ea Green ball  4 directions  20x ea Green ball  4 directions  20x ea   UT stretch w/TB Silver 5x10" Silver  5x10" Silver  5x10" Silver  5x10" Silver  5x10"   Body Blade 3 ways   15" x4 ea    3 ways   15" x4 ea

## 2019-12-20 ENCOUNTER — OFFICE VISIT (OUTPATIENT)
Dept: PHYSICAL THERAPY | Facility: CLINIC | Age: 39
End: 2019-12-20
Payer: OTHER MISCELLANEOUS

## 2019-12-20 DIAGNOSIS — M25.512 ACUTE PAIN OF LEFT SHOULDER: ICD-10-CM

## 2019-12-20 DIAGNOSIS — Z98.890 STATUS POST ARTHROSCOPY OF LEFT SHOULDER: Primary | ICD-10-CM

## 2019-12-20 DIAGNOSIS — S49.92XD ACROMIOCLAVICULAR JOINT INJURY, LEFT, SUBSEQUENT ENCOUNTER: ICD-10-CM

## 2019-12-20 PROCEDURE — 97110 THERAPEUTIC EXERCISES: CPT

## 2019-12-20 NOTE — PROGRESS NOTES
Daily Note     Today's date: 2019  Patient name: Kandy Velarde  : 1980  MRN: [id number]  Referring provider: Reynold Amaro MD  Dx:   Encounter Diagnosis     ICD-10-CM    1  Status post arthroscopy of left shoulder Z98 890    2  Acromioclavicular joint injury, left, subsequent encounter S49  92XD    3  Acute pain of left shoulder M25 512                   Subjective: Pt continues to report that he feel stronger in his LUE  Reports that some motions still bother his L shoulder when having to use quick fire muscles  Objective: See treatment diary below      Assessment: Tolerated treatment well  Progressed weight and resistance during multiple exercises to increase strength and endurance of the LUE  Luna Sings passes initiated to fire Type 2 muscles of the L shoulder  No exacerbation of shoulder pain this date  PROM of the L shoulder WFL during manuals  Patient demonstrated fatigue post treatment and would benefit from continued PT      Plan: Continue per plan of care        Precautions: none  Re-cert due 3/9/37  Manual     L shoulder PROM to jayda x5 mins    IR only this date x5 mins    IR only this date x10 mins    Focus on IR x10 mins    Focus on IR x5 mins    IR only this date                       Exercise Diary     HEP instruction        Pulleys Flex/scap  10"x10 ea Flex/scap  10"x10 ea Flex/scap  10" x10 ea Flex/scap  10"x10 ea Flex/scap  10"x10 ea   Finger ladder -- DC -- --    Isometric shoulder IR/ER Green TB IR/ER 2x10 ea Blue TB IR 2x10 Green TB ER 2x10  Green TB IR/ER 2x10 ea Green TB IR/ER 2x10 ea Green TB IR/ER 2x10 ea   MTP with TB Blue TB 5" 2x10 Blue TB 5" 2x10 Blue TB 5" 2x10 Blue TB 5" 2x10 Blue TB 5" 2x10   LTP with TB Blue TB 5" 2x10 Blue TB 5" 2x10 Blue TB 5" 2x10 Blue TB 5" 2x10 Blue TB 5" 2x10   Prone Ext 2# 2x10 3# 2x10 2# 2x10 2# 2x10 2# 2x10   Prone Hrz ABD 2# 2x10 3# 2x10 2# 2x10 2# 2x10 2# 2x10   Prone Flex 2# 2x10 3# 2x10 2# 2x10 2# 2x10 2# 2x10   Prone Row 2# 2x10 3# 2x10 2# 2x10 2# 2x10 2# 2x10   UBE 80 rpm 5'fwd/5'retro 80 rpm 5'fwd/5'retro 90 rpm 5'fwd/5'retro 90 rpm 5'fwd/5'retro 80 rpm 5'fwd/5'retro   Standing b/l shoulder flexion to 90* 3# 2x10 3# 2x10 2# 2x10 2# 2x10 3# 2x10   Standing b/l shoulder scaption to 90* 3# 2x10 3# 2x10 2# 2x10 2# 2x10 3# 2x10   Rhythmic stabilization - supine at 90* shoulder flexion 15" x4 15" x4  15" x4 15" x4   Rhythmic stabilization - supine shoulder flex , horiz abd 0-30 deg 2# x20 ea 3# x20 ea 2# x20 ea 2# x20 ea 2# x20 ea   Scap punches 1# 2x10, 5" 3# x20 2# 2x10, 5"  2# 2x10, 5" 2# 2x10, 5"   Scap ABCs 1# x1 3# x1 2# x1 2# x1 2# x1   Flexbar "[place]" Red FlexBar 15"x4 Green FlexBar 15"x4 Red FlexBar 15"x4 Red FlexBar 15"x4 Red FlexBar 15"x4   Rhythmic stabilization: Ball roll against wall Green ball  4 directions  20x ea Red ball  4 directions  20x ea Green ball  4 directions  20x ea Green ball  4 directions  20x ea Green ball  4 directions  20x ea   UT stretch w/TB Silver 5x10" Silver  5x10" Silver  5x10" Silver  5x10" Silver  5x10"   Body Blade 3 ways   15" x4 ea 3 way 15" x4 ea   3 ways   15" x4 ea   Chest Pass  Unweighted  ball 2x10      Overhead pass  Unweighted ball 2x10

## 2019-12-23 ENCOUNTER — OFFICE VISIT (OUTPATIENT)
Dept: PHYSICAL THERAPY | Facility: CLINIC | Age: 39
End: 2019-12-23
Payer: OTHER MISCELLANEOUS

## 2019-12-23 DIAGNOSIS — M25.512 ACUTE PAIN OF LEFT SHOULDER: ICD-10-CM

## 2019-12-23 DIAGNOSIS — S49.92XD ACROMIOCLAVICULAR JOINT INJURY, LEFT, SUBSEQUENT ENCOUNTER: ICD-10-CM

## 2019-12-23 DIAGNOSIS — Z98.890 STATUS POST ARTHROSCOPY OF LEFT SHOULDER: Primary | ICD-10-CM

## 2019-12-23 PROCEDURE — 97110 THERAPEUTIC EXERCISES: CPT | Performed by: PHYSICAL THERAPIST

## 2019-12-23 NOTE — PROGRESS NOTES
Daily Note     Today's date: 2019  Patient name: Gm Fountain  : 1980  MRN: [id number]  Referring provider: Taz Wise MD  Dx:   Encounter Diagnosis     ICD-10-CM    1  Status post arthroscopy of left shoulder Z98 890    2  Acromioclavicular joint injury, left, subsequent encounter S49  92XD    3  Acute pain of left shoulder M25 512                   Subjective: Patient reports his shoulder feels "sore every now and then" and still gets pain with unexpected sudden movements  "It's not the same as the pain I was having before surgery "      Objective: See treatment diary below      Assessment: Tolerated treatment well  Progressed weight with several exercises  Patient demonstrated fatigue post treatment, exhibited good technique with therapeutic exercises and would benefit from continued PT      Plan: Continue per plan of care  Progress treatment as tolerated  [place] the next [place]  Some discomfort reported with reversal in direction of movement with weighted objects and eccentric control  Will continue with strengthening exercises while away and is scheduled to resume PT upon return        Precautions: none  Re-cert due 3/9/33  Manual     L shoulder PROM to jayda x5 mins    IR only this date x5 mins    IR only this date x5 mins    Focus on IR x10 mins    Focus on IR x5 mins    IR only this date                       Exercise Diary     HEP instruction        Pulleys Flex/scap  10"x10 ea Flex/scap  10"x10 ea Flex/scap  10" x10 ea Flex/scap  10"x10 ea Flex/scap  10"x10 ea   Isometric shoulder IR/ER Green TB IR/ER 2x10 ea Blue TB IR 2x10 Green TB ER 2x10  Blue TB IR 2x10  Green TB ER 2x10 ea Green TB IR/ER 2x10 ea Green TB IR/ER 2x10 ea   MTP with TB Blue TB 5" 2x10 Blue TB 5" 2x10 Blue TB 5" 2x10 Blue TB 5" 2x10 Blue TB 5" 2x10   LTP with TB Blue TB 5" 2x10 Blue TB 5" 2x10 Blue TB 5" 2x10 Blue TB 5" 2x10 Blue TB 5" 2x10   Prone Ext 2# 2x10 3# 2x10 3# 2x10 2# 2x10 2# 2x10   Prone Hrz ABD 2# 2x10 3# 2x10 3# 2x10 2# 2x10 2# 2x10   Prone Flex 2# 2x10 3# 2x10 3# 2x10 2# 2x10 2# 2x10   Prone Row 2# 2x10 3# 2x10 3# 2x10 2# 2x10 2# 2x10   UBE 80 rpm 5'fwd/5'retro 80 rpm 5'fwd/5'retro 80 rpm 5'fwd/5'retro 90 rpm 5'fwd/5'retro 80 rpm 5'fwd/5'retro   Standing b/l shoulder flexion to 90* 3# 2x10 3# 2x10 4# 2x10 2# 2x10 3# 2x10   Standing b/l shoulder scaption to 90* 3# 2x10 3# 2x10 4# 2x10 2# 2x10 3# 2x10   Rhythmic stabilization - supine at 90* shoulder flexion 15" x4 15" x4 DC 15" x4 15" x4   Rhythmic stabilization - supine shoulder flex , horiz abd 0-30 deg 2# x20 ea 3# x20 ea 4# x20 ea 2# x20 ea 2# x20 ea   Scap punches 1# 2x10, 5" 3# x20 4# 2x10, 5"  2# 2x10, 5" 2# 2x10, 5"   Scap ABCs 1# x1 3# x1 4# x1 2# x1 2# x1   Flexbar "[place]" Red FlexBar 15"x4 Green FlexBar 15"x4 Green FlexBar 15"x4 Red FlexBar 15"x4 Red FlexBar 15"x4   Rhythmic stabilization: Ball roll against wall Green ball  4 directions  20x ea Red ball  4 directions  20x ea Red ball  4 directions  20x ea Green ball  4 directions  20x ea Green ball  4 directions  20x ea   UT stretch w/TB Silver 5x10" Silver  5x10" DC  Silver  5x10" Silver  5x10"   Body Blade 3 ways   15" x4 ea 3 way 15" x4 ea 3 way 15" x4 ea  3 ways   15" x4 ea   Chest Pass  Unweighted  ball 2x10 sm Red medicine ball  x20     Overhead pass  Unweighted ball 2x10 unweighted self toss above doorway  x20     Crate lifts   20# low mat <-> high table 10x to L, 10x to R

## 2020-01-02 ENCOUNTER — EVALUATION (OUTPATIENT)
Dept: PHYSICAL THERAPY | Facility: CLINIC | Age: 40
End: 2020-01-02
Payer: OTHER MISCELLANEOUS

## 2020-01-02 DIAGNOSIS — M25.512 ACUTE PAIN OF LEFT SHOULDER: ICD-10-CM

## 2020-01-02 DIAGNOSIS — S49.92XD ACROMIOCLAVICULAR JOINT INJURY, LEFT, SUBSEQUENT ENCOUNTER: ICD-10-CM

## 2020-01-02 DIAGNOSIS — Z98.890 STATUS POST ARTHROSCOPY OF LEFT SHOULDER: Primary | ICD-10-CM

## 2020-01-02 PROCEDURE — 97010 HOT OR COLD PACKS THERAPY: CPT | Performed by: PHYSICAL THERAPIST

## 2020-01-02 PROCEDURE — 97110 THERAPEUTIC EXERCISES: CPT | Performed by: PHYSICAL THERAPIST

## 2020-01-02 NOTE — PROGRESS NOTES
PT Progress Note     Today's date: 2020  Patient name: Becky Toledo  : 1980  MRN: [id number]  Referring provider: Elmira Pete MD  Dx:   Encounter Diagnosis     ICD-10-CM    1  Status post arthroscopy of left shoulder Z98 890    2  Acromioclavicular joint injury, left, subsequent encounter S49  92XD    3  Acute pain of left shoulder M25 512                   Assessment  Becky Toledo has remained compliant with PT services  He has demonstrated decreased pain, increased strength, increased range of motion, and increased activity tolerance since starting physical therapy services  They report an overall improvement of 75% thus far  He continues to present with mild pain with heavier lifting (>40 lbs), decreased strength and endurance, mild decreased range of motion, and decreased activity tolerance and would benefit from additional skilled physical therapy interventions to address impairments and maximize function  Patient tolerated progressions to gym equipment this date to progress to more aggressive strength training required to RTW without restrictions  Tolerated progressions well with "soreness" but no increase in pain  Will continue to monitor response and progress as tolerated  Impairments: abnormal or restricted ROM, activity intolerance, impaired physical strength, pain with function and scapular dyskinesis  Functional limitations: limited use of L UE (sling when not at home), limited ROM and strength  Understanding of Dx/Px/POC: good   Prognosis: good    Goals    STGs  1) In 4 weeks patient will report reduced pain 50% or greater, without pain medication  -MET  2) In 4 weeks patient will demonstrate independence with HEP -MET  5) In 4 weeks patient will demonstrate L shoulder strength of 4-/5 or greater in all directions with MMT    -MET    LTGs  1) In 4-8 weeks patient will demonstrate pain free L shoulder ROM WNL all directions - MET  2) In 4-8 weeks patient will demonstrate L shoulder strength of at least 4/5 MMT all directions -MET  3) in 4-8 weeks patient will demonstrate ability to lift at least 10 lbs above shoulder height with L UE - progressing  4) In 8-12 weeks patient will demonstrate ability to lift/lower 50 lbs using b/l UEs from overhead to waist height  Plan  Patient would benefit from: skilled physical therapy  Referral necessary: No  Planned modality interventions: cryotherapy, TENS, unattended electrical stimulation and thermotherapy: hydrocollator packs  Planned therapy interventions: joint mobilization, manual therapy, massage, Maloney taping, home exercise program, functional ROM exercises, flexibility, therapeutic exercise, stretching, strengthening, patient education, self care and postural training  Frequency: 3x week  Duration in weeks: 4  Plan of Care beginning date: 1/2/2020  Plan of Care expiration date: 1/30/2020  Treatment plan discussed with: patient        Subjective Evaluation    History of Present Illness  Date of onset: 8/23/2019  Mechanism of injury: Patient presents to PT after work related injury  Patient is employed as a  at [employer]  Reports injury on 8/23/19 to L shoulder when attempting to lift a 10 lb box from about waist height and felt an immediate sharp pain in the L shoulder  He stopped working at that moment, reported to his manager, went on lunch and the pain worsened  Saw WC doc that same day  Had xrays but no MRI at this time  Patient saw ortho doc (referring Dr Rebeca Bustos) on Tues 9/17 and received steroid injection which provided much relief from pain on the day of and day after the injection  Reports the "grinding" is also less since the injection  Pain is primarily L shoulder superior and anterior  Occasionally feels "sore" into L elbow and forearm  Reports intermittent "numbness" along lateral aspect of forearm if he rests arm on pillow  Pain overall has gradually improved since the injury occurred   He uses a sling when out in public to avoid using his arm  RA 10/14:  Patient reports he is feeling better since he started PT  He reports compliance with lifting restrictions at home and at work, however he still has pain with repetitive use of L UE, WB onto L UE and with reaching/pushing/pulling  Nolvia has pain with quick sudden movements  He feels about 50% improved since his SOC  Reports that he no longer experiences the intermittent "numbness" along lateral aspect of forearm  Still using his sling when out in public and when at work not having to use L UE  RA 11/7:  Patient returns to PT after surgery on L shoulder 10/31/19  He denies any complications from the surgery  He reports nerve block lasted 3 days before wearing off  He now takes  mg as needed, usually in the morning  Takes prescribed oxycodone as needed as prescribed, but usually only needs to take once a day before bed  Patient is using his sling when "out and about" but does not use it when inside  Overall he feels a little less functional right now compared to before surgery, but feels it is already starting to feel better as the days go on  Patient has a f/u with surgeon in beginning of December  Will not be working at least until that follow up  Progress note 12/9:  Patient reports his shoulder is "better than it was" before surgery  He currently reports difficulty/pain with any sudden movements of the arm or with certain isometric resistance such as when attempting to open  when it is locked closed or with pulling blankets over in bed  Had difficulty using L hand/arm to stabilize when carving turkey on Thanksgiving day  Also notes discomfort/strain with overhead activity  Progress Note 1/2/20:  Patient reports he feels about 75% recovered at this time  At this time patient reports biggest challenge at this time is "heavy lifting"  Heaviest object lifted until this point was recently his dog (weighs 50 lbs)   He reports lifting her from the floor to waist with both hands and can feel a pain/discomfort in his left shoulder  He also notes some slight "popping" in the shoulder if he is reaching forward  He reports it's not consistent, not necessarily only with sudden movements but thinks he notices it more when moving after sitting/resting for a period of time  Patient also reports he cannot sleep with his left arm up above his head, needs to keep his arm more by his side or rested on belly  He reports he has not been needing to take any medication for pain and rarely needs to use ice  Pain  IE    RA 10/14: RA : : 20:  Current pain ratin  3  3  1 0  At best pain ratin  0  1 (w/meds) 0 0  At worst pain ratin  7  5  5 3  Quality: ache    Relieving factors: ice and medications  RA 10/14: takes Aleve or Tylenol prn (reports taking this about every other day), uses ice daily  RA : support (sling), rx medications, ice (using machine that was provided after surgery)  : takes OTC IBU after PT  : has not needed any medication per patient    Aggravating factors: overhead activity and lifting  RA 10/14: patient has been compliant with not lifting >10 lbs, pain increases with WB/leaning onto L UE and when using L UE for prolonged periods (hand held computer , etc) , pain with lowering L UE from elevated position and with reaching forward, pushing/pulling  RA : lifting  : OHA, sudden movement  12: lifting > 40 lbs    Social Support  Lives in: [facility]  Lives with: spouse and young children    Employment status: not working (employed as a  at [employer], currently injured)  Hand dominance: left  Exercise history: daily walks    Treatments  Current treatment: injection treatment and medication  Patient Goals  Patient goals for therapy: return to work and decreased pain          Objective     Tenderness     Left Shoulder   Tenderness in the [facility] joint, acromion, clavicle and coracoid process   No tenderness in the biceps tendon (proximal), bicipital groove, infraspinatus tendon, lateral scapula, medial scapula, scapular spine, SC joint, subscapularis tendon and supraspinatus tendon  RA 10/14: (+) ttp persists on L AC jt, infraspinatus and supraspinatus tendons      Active Range of Motion    Cervical  Flexion:  WFL  Extension:  WFL  Left lateral flexion:  WFL  Right lateral flexion:  WFL  Left rotation:  WFL  Right rotation:  [facility]    IE      RA 10/14    Left Shoulder  Flexion: 110 degrees with pain  135 deg with pain  Extension: 30 degrees    65 deg  Abduction: 60 degrees with pain  60 deg (pain in AC jt), can lift to 150* with pain  External rotation BTH: T1 with pain  T6  Internal rotation BTB: L2 with pain  L1 with pain    RA 11/7:    12/9:     1/2:  Flexion: 145 deg w/pain  170 deg (arc of pain  deg) 170* [email or web address]  Extension: 25 deg w/pain  40 deg w/discomfort   65* w/"tightness"  Abduction: 85 deg w/pain  170 deg w/discomfort   170* [email or web address]*  External rotation BTH: C6 w/pain T6     T6  Internal rotation BTB: T11  T10 w/pain    T10 w/"pulling"    Additional Active Range of Motion Details  Pain reproduced with all AROM   Extension, IR, ER >> abduction and flexion  RA 10/14: pain reproduced with AROM flexion, abduction and IR  RA 11/7: pain reproduced w/AROM flexion beginning at 90 deg and moves slowly to continue raising arm, reports "strain"   1/2: pain at end range flexion, "tightness" with extension >30*, pain at mid-range abduction, "painful pull" anterior shoulder with end range IR    Passive Range of Motion   IE      RA 10/14    Left Shoulder   Flexion: [facility]     WFL pain free    Abduction: 50 degrees with pain  150 deg pain free   External rotation 90°: 65 degrees   90+ deg pain free   Internal rotation 90°: 40 degrees with pain 50 deg pain free     RA 11/7:    12/9:  Flexion: 152 deg    WNL pain free  Abduction: 120 deg   160 deg  External rotation @ 90: 70 deg 90+ deg pain free  Internal rotation @ 90: 25 deg  52 deg    Strength/Myotome Testing   IE     RA 10/14 RA 11/7: 12/9:  1/2  Left Shoulder     Planes of Motion   Flexion: 4-     4+  NT  4+w/pain 4+  Extension: 4-     4+  NT  4+w/pain 4+  Abduction: 4-     4- with pain NT  4+  5  External rotation at 0°: 4-   4+  NT  4+  5  External rotation at 90°: 4-  4+  NT  4+  5    Additional Strength Details  MMT tested with shoulder shoulder neutral (arm by side)  RA 11/7: NT due to recent surgery  Will test at next assessment  1/2: "strain" with MMT flex and abd      Tests     Left Shoulder   Positive anterior load and shift, scapular relocation  and scapular assistance test positive  Negative belly press, clunk, crank, Hawkin's, Neer's, painful arc, passive horizontal adduction and bicep load      RA 10/14: (-) anterior load and shift, (+) scapular relocation, (+) scapular assistance test  RA 11/7: N/A, post-op      Flowsheet Rows      Most Recent Value   PT/OT G-Codes   Current Score  67 (initial evaluation = 32)   Projected Score  67        Precautions: none  Re-cert due 8/88/92  Manual  12/18 12/20 12/23 1/2 12/16   L shoulder PROM to jayda x5 mins    IR only this date x5 mins    IR only this date x5 mins    Focus on IR x5 mins    Focus on IR x5 mins    IR only this date                       Exercise Diary  12/18 12/20 12/23 1/2 12/16   HEP instruction        Pulleys Flex/scap  10"x10 ea Flex/scap  10"x10 ea Flex/scap  10" x10 ea DC Flex/scap  10"x10 ea   Isometric shoulder IR/ER Green TB IR/ER 2x10 ea Blue TB IR 2x10 Green TB ER 2x10  Blue TB IR 2x10  Green TB ER 2x10 ea Resume NV at 90-90 position Green TB IR/ER 2x10 ea   MTP with TB Blue TB 5" 2x10 Blue TB 5" 2x10 Blue TB 5" 2x10 DC Blue TB 5" 2x10   LTP with TB Blue TB 5" 2x10 Blue TB 5" 2x10 Blue TB 5" 2x10 DC Blue TB 5" 2x10   Prone Ext 2# 2x10 3# 2x10 3# 2x10 DC 2# 2x10   Prone Hrz ABD 2# 2x10 3# 2x10 3# 2x10 DC 2# 2x10   Prone Flex 2# 2x10 3# 2x10 3# 2x10 DC 2# 2x10   Prone Row 2# 2x10 3# 2x10 3# 2x10 DC 2# 2x10   UBE 80 rpm 5'fwd/5'retro 80 rpm 5'fwd/5'retro 80 rpm 5'fwd/5'retro 70 rpm 5'fwd/5'retro 80 rpm 5'fwd/5'retro   Standing b/l shoulder flexion to 90* 3# 2x10 3# 2x10 4# 2x10 Resume NV 3# 2x10   Standing b/l shoulder scaption to 90* 3# 2x10 3# 2x10 4# 2x10 Resume NV 3# 2x10   Rhythmic stabilization - supine at 90* shoulder flexion 15" x4 15" x4 DC -- 15" x4   Rhythmic stabilization - supine shoulder flex , horiz abd 0-30 deg 2# x20 ea 3# x20 ea 4# x20 ea Resume NV 2# x20 ea   Scap punches 1# 2x10, 5" 3# x20 4# 2x10, 5"  DC 2# 2x10, 5"   Scap ABCs 1# x1 3# x1 4# x1 Resume NV 2# x1   Flexbar "[place]" Red FlexBar 15"x4 Green FlexBar 15"x4 Green FlexBar 15"x4 DC Red FlexBar 15"x4   Rhythmic stabilization: Ball roll against wall Green ball  4 directions  20x ea Red ball  4 directions  20x ea Red ball  4 directions  20x ea Resume NV Green ball  4 directions  20x ea   UT stretch w/TB Silver 5x10" Silver  5x10" DC  -- Silver  5x10"   Body Blade 3 ways   15" x4 ea 3 way 15" x4 ea 3 way 15" x4 ea Resume NV 3 ways   15" x4 ea   Chest Pass  Unweighted  ball 2x10 sm Red medicine ball  x20 Chest Press machine  10# 2x10    Overhead pass  Unweighted ball 2x10 unweighted self toss above doorway  x20 Resume NV    Crate lifts   20# low mat <-> high table 10x to L, 10x to R held    Cart push-pull    25 feet  50# x10 ea    Lat pull down machine    50# 2x10    Biceps curls at cable column    Straight bar  40# 2x10    Triceps extensions at cable column    Rope  50# 2x10    Seated row machine    40# 2x10    Seated overhead shoulder press    Dumbbells   2-3# NV    Wall push ups    NV    Sleeper stretch sidelying    10"x10                    Modalities        CP to L shoulder post tx    x10 mins

## 2020-01-03 ENCOUNTER — OFFICE VISIT (OUTPATIENT)
Dept: OBGYN CLINIC | Facility: CLINIC | Age: 40
End: 2020-01-03

## 2020-01-03 VITALS
SYSTOLIC BLOOD PRESSURE: 133 MMHG | HEIGHT: 67 IN | DIASTOLIC BLOOD PRESSURE: 81 MMHG | BODY MASS INDEX: 27.88 KG/M2 | WEIGHT: 177.6 LBS

## 2020-01-03 DIAGNOSIS — Z98.890 STATUS POST ARTHROSCOPY OF LEFT SHOULDER: Primary | ICD-10-CM

## 2020-01-03 PROCEDURE — 99024 POSTOP FOLLOW-UP VISIT: CPT | Performed by: ORTHOPAEDIC SURGERY

## 2020-01-03 NOTE — LETTER
January 3, 2020     Patient: Scar Sethi   YOB: 1980   Date of Visit: 1/3/2020       To Whom it May Concern:    Scar Sethi is under my professional care  He was seen in my office on 1/3/2020  The patient can return to work without restrictions on 1/27/2020  If you have any questions or concerns, please don't hesitate to call           Sincerely,          Luciana Borden MD        CC: No Recipients

## 2020-01-03 NOTE — PROGRESS NOTES
Assessment:  1  Status post arthroscopy of left shoulder         Plan:  The patient should continue physical therapy  He can return to work without restrictions on 1/27/2020  He should return to office in 6 weeks  To do next visit:  Return in about 6 weeks (around 2/14/2020)  The above stated was discussed in layman's terms and the patient expressed understanding  All questions were answered to the patient's satisfaction  Scribe Attestation    I,:   Celena Arechiga am acting as a scribe while in the presence of the attending physician :        I,:   Edis Nguyen MD personally performed the services described in this documentation    as scribed in my presence :              Subjective:   Ruslan Hull is a 44 y o  male who presents 2 months s/p left shoulder arthroscopy with subacromial decompression, synovectomy and distal clavicle excision, 10/31/19  He is progressing  Today he complains of generalized left shoulder pain  Extreme ROM can aggravate  Rest alleviates  He does participate in physical therapy 3x/week with benefit          Review of systems negative unless otherwise specified in HPI    Past Medical History:   Diagnosis Date    DDD (degenerative disc disease), cervical     H/O shoulder surgery     left side    Neck pain     Occasional    PONV (postoperative nausea and vomiting)     Seasonal allergies     Wears glasses        Past Surgical History:   Procedure Laterality Date    CONTROL BLEED POST TONSILLECTOMY  10/31/2017    Procedure: CONTROL BLEED POST TONSILLECTOMY;  Surgeon: Maryuri Harrison DO;  Location: [facility];  Service: ENT    HEMORRHOID SURGERY      x2    NH REMOVAL OF TONSILS,12+ Y/O N/A 10/31/2017    Procedure: TONSILLECTOMY;  Surgeon: Maryuri Harrison DO;  Location: [facility];  Service: ENT    NH SHLDR ARTHROSCOP,SURG,REPAIR,SLAP LESION Left 10/31/2019    Procedure: ARTHROSCOPY SHOULDER,  AC Joint Resection, SAD, Synovectomy;  Surgeon: Edis Nguyen MD; Location: [facility];  Service: Orthopedics    SHOULDER SURGERY Right     x2- decompression    TONSILLECTOMY      WISDOM TOOTH EXTRACTION         Family History   Problem Relation Age of Onset    Graves' disease Mother     Hypertension Father     Cancer Paternal Uncle        Social History     Occupational History    Not on file   Tobacco Use    Smoking status: Former Smoker     Packs/day: 1 00     Years: 12 00     Pack years: 12 00     Types: Cigarettes     Last attempt to quit:      Years since quittin 0    Smokeless tobacco: Never Used   Substance and Sexual Activity    Alcohol use: Yes     Comment: 1 weekly    Drug use: No    Sexual activity: Not on file         Current Outpatient Medications:     ibuprofen (MOTRIN) 800 mg tablet, Take 400 mg by mouth every 6 (six) hours as needed for mild pain , Disp: , Rfl:     naproxen sodium (ALEVE) 220 MG tablet, Take 220 mg by mouth 2 (two) times a day with meals, Disp: , Rfl:     No Known Allergies         Vitals:    20 0946   BP: 133/81       Objective:  Physical exam  · General: Awake, Alert, Oriented  · Eyes: Pupils equal, round and reactive to light  · Heart: regular rate and rhythm  · Lungs: No audible wheezing  · Abdomen: soft                    Ortho Exam   Left shoulder:  No erythema or ecchymosis  Well healed arthroscopic portals   Good ROM with no pain  Sensation intact     Diagnostics, reviewed and taken today if performed as documented:    None performed    Procedures, if performed today:    Procedures    None performed      Portions of the record may have been created with voice recognition software  Occasional wrong word or "sound a like" substitutions may have occurred due to the inherent limitations of voice recognition software  Read the chart carefully and recognize, using context, where substitutions have occurred

## 2020-01-06 ENCOUNTER — OFFICE VISIT (OUTPATIENT)
Dept: PHYSICAL THERAPY | Facility: CLINIC | Age: 40
End: 2020-01-06
Payer: OTHER MISCELLANEOUS

## 2020-01-06 DIAGNOSIS — Z98.890 STATUS POST ARTHROSCOPY OF LEFT SHOULDER: Primary | ICD-10-CM

## 2020-01-06 DIAGNOSIS — M25.512 ACUTE PAIN OF LEFT SHOULDER: ICD-10-CM

## 2020-01-06 DIAGNOSIS — S49.92XD ACROMIOCLAVICULAR JOINT INJURY, LEFT, SUBSEQUENT ENCOUNTER: ICD-10-CM

## 2020-01-06 PROCEDURE — 97110 THERAPEUTIC EXERCISES: CPT

## 2020-01-06 NOTE — PROGRESS NOTES
Daily Note     Today's date: 2020  Patient name: Ashley Maradiaga  : 1980  MRN: [id number]  Referring provider: Azalea Henderson MD  Dx:   Encounter Diagnosis     ICD-10-CM    1  Status post arthroscopy of left shoulder Z98 890    2  Acromioclavicular joint injury, left, subsequent encounter S49  92XD    3  Acute pain of left shoulder M25 512                   Subjective: "I was a little sore after lifting the weights LV " Pt reports his shoulder is "feeling good today " Pt return to work date is   Objective: See treatment diary below      Assessment: Tolerated treatment well  Focused on gaining strength of the L shoulder  No exacerbation of L shoulder pain this date  Patient demonstrated fatigue post treatment and would benefit from continued PT      Plan: Continue per plan of care        Precautions: none  Re-cert due   Manual     L shoulder PROM to jayda x5 mins    IR only this date x5 mins    IR only this date x5 mins    Focus on IR x5 mins    Focus on IR NP                       Exercise Diary     HEP instruction        Pulleys Flex/scap  10"x10 ea Flex/scap  10"x10 ea Flex/scap  10" x10 ea DC --   Isometric shoulder IR/ER Green TB IR/ER 2x10 ea Blue TB IR 2x10 Green TB ER 2x10  Blue TB IR 2x10  Green TB ER 2x10 ea Resume NV at 90-90 position Blue TB IR/ER 2x10 ea   MTP with TB Blue TB 5" 2x10 Blue TB 5" 2x10 Blue TB 5" 2x10 DC --   LTP with TB Blue TB 5" 2x10 Blue TB 5" 2x10 Blue TB 5" 2x10 DC --   Prone Ext 2# 2x10 3# 2x10 3# 2x10 DC --   Prone Hrz ABD 2# 2x10 3# 2x10 3# 2x10 DC --   Prone Flex 2# 2x10 3# 2x10 3# 2x10 DC --   Prone Row 2# 2x10 3# 2x10 3# 2x10 DC --   UBE 80 rpm 5'fwd/5'retro 80 rpm 5'fwd/5'retro 80 rpm 5'fwd/5'retro 70 rpm 5'fwd/5'retro 70 rpm 5'fwd/5'retro   Standing b/l shoulder flexion to 90* 3# 2x10 3# 2x10 4# 2x10 Resume NV 4# 2x10   Standing b/l shoulder scaption to 90* 3# 2x10 3# 2x10 4# 2x10 Resume NV 4# 2x10   Rhythmic stabilization - supine at 90* shoulder flexion 15" x4 15" x4 DC -- --   Rhythmic stabilization - supine shoulder flex , horiz abd 0-30 deg 2# x20 ea 3# x20 ea 4# x20 ea Resume NV 4# x20 ea   Scap punches 1# 2x10, 5" 3# x20 4# 2x10, 5"  DC --   Scap ABCs 1# x1 3# x1 4# x1 Resume NV 4# x1   Flexbar "[place]" Red FlexBar 15"x4 Green FlexBar 15"x4 Green FlexBar 15"x4 DC --   Rhythmic stabilization: Ball roll against wall Green ball  4 directions  20x ea Red ball  4 directions  20x ea Red ball  4 directions  20x ea Resume NV Red ball  4 directions  20x ea   UT stretch w/TB Silver 5x10" Silver  5x10" DC  -- --   Body Blade 3 ways   15" x4 ea 3 way 15" x4 ea 3 way 15" x4 ea Resume NV 3 ways   15" x4 ea   Chest Pass  Unweighted  ball 2x10 sm Red medicine ball  x20 Chest Press machine  10# 2x10 Chest Press machine  10# 2x10   Overhead pass  Unweighted ball 2x10 unweighted self toss above doorway  x20 Resume NV unweighted self toss above doorway  x20   Crate lifts   20# low mat <-> high table 10x to L, 10x to R held    Cart push-pull    25 feet  50# x10 ea 25 feet  55# x10 ea   Lat pull down machine    50# 2x10 50# 2x10   Biceps curls at cable column    Straight bar  40# 2x10 Straight bar  40# 2x10   Triceps extensions at cable column    Rope  50# 2x10 Rope  50# 2x10   Seated row machine    40# 2x10 40# 2x10   Seated overhead shoulder press    Dumbbells   2-3# NV Dumbbells   3# 2x10   Wall push ups    NV    Sleeper stretch sidelying    10"x10 10"x10                   Modalities        CP to L shoulder post tx    x10 mins

## 2020-01-08 ENCOUNTER — APPOINTMENT (OUTPATIENT)
Dept: PHYSICAL THERAPY | Facility: CLINIC | Age: 40
End: 2020-01-08
Payer: OTHER MISCELLANEOUS

## 2020-01-10 ENCOUNTER — OFFICE VISIT (OUTPATIENT)
Dept: PHYSICAL THERAPY | Facility: CLINIC | Age: 40
End: 2020-01-10
Payer: OTHER MISCELLANEOUS

## 2020-01-10 DIAGNOSIS — Z98.890 STATUS POST ARTHROSCOPY OF LEFT SHOULDER: Primary | ICD-10-CM

## 2020-01-10 DIAGNOSIS — M25.512 ACUTE PAIN OF LEFT SHOULDER: ICD-10-CM

## 2020-01-10 DIAGNOSIS — S49.92XD ACROMIOCLAVICULAR JOINT INJURY, LEFT, SUBSEQUENT ENCOUNTER: ICD-10-CM

## 2020-01-10 PROCEDURE — 97110 THERAPEUTIC EXERCISES: CPT

## 2020-01-10 NOTE — PROGRESS NOTES
Daily Note     Today's date: 1/10/2020  Patient name: Bruno Moulton  : 1980  MRN: [id number]  Referring provider: Carine Gardner MD  Dx:   Encounter Diagnosis     ICD-10-CM    1  Status post arthroscopy of left shoulder Z98 890    2  Acromioclavicular joint injury, left, subsequent encounter S49  92XD    3  Acute pain of left shoulder M25 512                   Subjective: Pt report that most activities are getting easier  He denies pain of the L shoulder  Objective: See treatment diary below      Assessment: Tolerated treatment well  Multiple increases in reps and weights to strengthen the LUE and progress endurance of the L shoulder  Patient demonstrated fatigue post treatment and would benefit from continued PT      Plan: Continue per plan of care        Precautions: none  Re-cert due 69  Manual  1/10 12/20 12/23 1/2 1/6   L shoulder PROM to jayda NP x5 mins    IR only this date x5 mins    Focus on IR x5 mins    Focus on IR NP                       Exercise Diary  1/10 12/20 12/23 1/2 1/6   HEP instruction        Pulleys  Flex/scap  10"x10 ea Flex/scap  10" x10 ea DC --   Isometric shoulder IR/ER Blue TB 90-90 ER 2x10 ea Blue TB IR 2x10 Green TB ER 2x10  Blue TB IR 2x10  Green TB ER 2x10 ea Resume NV at 90-90 position Blue TB IR/ER 2x10 ea   MTP with TB  Blue TB 5" 2x10 Blue TB 5" 2x10 DC --   LTP with TB  Blue TB 5" 2x10 Blue TB 5" 2x10 DC --   Prone Ext  3# 2x10 3# 2x10 DC --   Prone Hrz ABD  3# 2x10 3# 2x10 DC --   Prone Flex  3# 2x10 3# 2x10 DC --   Prone Row  3# 2x10 3# 2x10 DC --   UBE 60 rpm 5'fwd/5'retro 80 rpm 5'fwd/5'retro 80 rpm 5'fwd/5'retro 70 rpm 5'fwd/5'retro 70 rpm 5'fwd/5'retro   Standing b/l shoulder flexion to 90* 5# 2x10 3# 2x10 4# 2x10 Resume NV 4# 2x10   Standing b/l shoulder scaption to 90* 5# 2x10 3# 2x10 4# 2x10 Resume NV 4# 2x10   Rhythmic stabilization - supine at 90* shoulder flexion -- 15" x4 DC -- --   Rhythmic stabilization - supine shoulder flex , horiz abd 0-30 deg 5# x20 ea 3# x20 ea 4# x20 ea Resume NV 4# x20 ea   Scap punches  3# x20 4# 2x10, 5"  DC --   Scap ABCs 5# x1 3# x1 4# x1 Resume NV 4# x1   Flexbar "[place]"  Green FlexBar 15"x4 Green FlexBar 15"x4 DC --   Rhythmic stabilization: Ball roll against wall Red ball  4 directions  20x ea Red ball  4 directions  20x ea Red ball  4 directions  20x ea Resume NV Red ball  4 directions  20x ea   UT stretch w/TB  Silver  5x10" DC  -- --   Body Blade 3 ways   15" x4 ea 3 way 15" x4 ea 3 way 15" x4 ea Resume NV 3 ways   15" x4 ea   Chest Pass Chest Press machine  10# 3x10 Unweighted  ball 2x10 sm Red medicine ball  x20 Chest Press machine  10# 2x10 Chest Press machine  10# 2x10   Overhead pass unweighted self toss above doorway  x20 Unweighted ball 2x10 unweighted self toss above doorway  x20 Resume NV unweighted self toss above doorway  x20   Crate lifts   20# low mat <-> high table 10x to L, 10x to R held    Cart push-pull 25 feet  55# x10 ea   25 feet  50# x10 ea 25 feet  55# x10 ea   Lat pull down machine 50# 3x10   50# 2x10 50# 2x10   Biceps curls at cable column Straight bar  40# 3x10   Straight bar  40# 2x10 Straight bar  40# 2x10   Triceps extensions at cable column Rope  50# 3x10   Rope  50# 2x10 Rope  50# 2x10   Seated row machine 40# 3x10   40# 2x10 40# 2x10   Seated overhead shoulder press Dumbbells   4# 2x10   Dumbbells   2-3# NV Dumbbells   3# 2x10   TB wall walks Green TB 5 steps x5       Wall push ups Green TB 2x10   NV    Sleeper stretch sidelying 10"x10   10"x10 10"x10                   Modalities        CP to L shoulder post tx    x10 mins

## 2020-01-13 ENCOUNTER — OFFICE VISIT (OUTPATIENT)
Dept: PHYSICAL THERAPY | Facility: CLINIC | Age: 40
End: 2020-01-13
Payer: OTHER MISCELLANEOUS

## 2020-01-13 DIAGNOSIS — Z98.890 STATUS POST ARTHROSCOPY OF LEFT SHOULDER: Primary | ICD-10-CM

## 2020-01-13 DIAGNOSIS — S49.92XD ACROMIOCLAVICULAR JOINT INJURY, LEFT, SUBSEQUENT ENCOUNTER: ICD-10-CM

## 2020-01-13 DIAGNOSIS — M25.512 ACUTE PAIN OF LEFT SHOULDER: ICD-10-CM

## 2020-01-13 PROCEDURE — 97110 THERAPEUTIC EXERCISES: CPT

## 2020-01-13 NOTE — PROGRESS NOTES
Daily Note     Today's date: 2020  Patient name: Anthony Mai  : 1980  MRN: [id number]  Referring provider: Hermelindo Barrow MD  Dx:   Encounter Diagnosis     ICD-10-CM    1  Status post arthroscopy of left shoulder Z98 890    2  Acromioclavicular joint injury, left, subsequent encounter S49  92XD    3  Acute pain of left shoulder M25 512                   Subjective: Pt denies L shoulder pain at rest        Objective: See treatment diary below      Assessment: Tolerated treatment well  Progressed weight during a chest press with no exacerbation of symptoms  Patient demonstrated fatigue post treatment and would benefit from continued PT      Plan: Continue per plan of care        Precautions: none  Re-cert due 6/10/83  Manual  1/10 1/13 12/23 1/2 1/6   L shoulder PROM to jayda NP x5 mins    IR only this date x5 mins    Focus on IR x5 mins    Focus on IR NP                       Exercise Diary  1/10 12/20 12/23 1/2 1/6   HEP instruction        Pulleys  Flex/scap  10"x10 ea Flex/scap  10" x10 ea DC --   Isometric shoulder IR/ER Blue TB 90-90 ER 2x10 ea Blue TB IR 2x10 Green TB ER 2x10  Blue TB IR 2x10  Green TB ER 2x10 ea Resume NV at 90-90 position Blue TB IR/ER 2x10 ea   MTP with TB   Blue TB 5" 2x10 DC --   LTP with TB   Blue TB 5" 2x10 DC --   Prone Ext   3# 2x10 DC --   Prone Hrz ABD   3# 2x10 DC --   Prone Flex   3# 2x10 DC --   Prone Row   3# 2x10 DC --   UBE 60 rpm 5'fwd/5'retro 60 rpm 5'fwd/5'retro 80 rpm 5'fwd/5'retro 70 rpm 5'fwd/5'retro 70 rpm 5'fwd/5'retro   Standing b/l shoulder flexion to 90* 5# 2x10 5# 2x10 4# 2x10 Resume NV 4# 2x10   Standing b/l shoulder scaption to 90* 5# 2x10 5# 2x10 4# 2x10 Resume NV 4# 2x10           Rhythmic stabilization - supine shoulder flex , horiz abd 0-30 deg 5# x20 ea 5# x20 ea 4# x20 ea Resume NV 4# x20 ea   Scap punches   4# 2x10, 5"  DC --   Scap ABCs 5# x1 5# x1 4# x1 Resume NV 4# x1   Flexbar "[place]"   [facility] 15"x4 DC -- Rhythmic stabilization: Ball roll against wall Red ball  4 directions  20x ea Red ball  4 directions  20x ea Red ball  4 directions  20x ea Resume NV Red ball  4 directions  20x ea   UT stretch w/TB  Silver  5x10" DC  -- --   Body Blade 3 ways   15" x4 ea 3 way 15" x4 ea 3 way 15" x4 ea Resume NV 3 ways   15" x4 ea   Chest Pass Chest Press machine  10# 3x10 Chest Press machine  50# 3x10 sm Red medicine ball  x20 Chest Press machine  10# 2x10 Chest Press machine  10# 2x10   Overhead pass unweighted self toss above doorway  x20 unweighted self toss above doorway  x20 unweighted self toss above doorway  x20 Resume NV unweighted self toss above doorway  x20   Crate lifts   20# low mat <-> high table 10x to L, 10x to R held    Cart push-pull 25 feet  55# x10 ea 25 feet  55# x10 ea  25 feet  50# x10 ea 25 feet  55# x10 ea   Lat pull down machine 50# 3x10 50# 3x10  50# 2x10 50# 2x10   Biceps curls at cable column Straight bar  40# 3x10 Straight bar  40# 3x10  Straight bar  40# 2x10 Straight bar  40# 2x10   Triceps extensions at cable column Rope  50# 3x10 Rope  50# 3x10  Rope  50# 2x10 Rope  50# 2x10   Seated row machine 40# 3x10 40# 3x10  40# 2x10 40# 2x10   Seated overhead shoulder press Dumbbells   4# 2x10 Dumbbells   5# 2x10  Dumbbells   2-3# NV Dumbbells   3# 2x10   TB wall walks Green TB 5 steps x5 Green TB 5 steps 2x5      Wall push ups Green TB 2x10 Green TB 2x10  NV    Sleeper stretch sidelying 10"x10 10"x10  10"x10 10"x10   rhythmic stabilization overhead with TB  x5 reps w/ Green TB              Modalities        CP to L shoulder post tx    x10 mins

## 2020-01-15 ENCOUNTER — OFFICE VISIT (OUTPATIENT)
Dept: PHYSICAL THERAPY | Facility: CLINIC | Age: 40
End: 2020-01-15
Payer: OTHER MISCELLANEOUS

## 2020-01-15 DIAGNOSIS — M25.512 ACUTE PAIN OF LEFT SHOULDER: ICD-10-CM

## 2020-01-15 DIAGNOSIS — Z98.890 STATUS POST ARTHROSCOPY OF LEFT SHOULDER: Primary | ICD-10-CM

## 2020-01-15 DIAGNOSIS — S49.92XD ACROMIOCLAVICULAR JOINT INJURY, LEFT, SUBSEQUENT ENCOUNTER: ICD-10-CM

## 2020-01-15 PROCEDURE — 97110 THERAPEUTIC EXERCISES: CPT

## 2020-01-15 NOTE — PROGRESS NOTES
Daily Note     Today's date: 1/15/2020  Patient name: Ashley Maradiaga  : 1980  MRN: [id number]  Referring provider: Azalea Henderson MD  Dx:   Encounter Diagnosis     ICD-10-CM    1  Status post arthroscopy of left shoulder Z98 890    2  Acromioclavicular joint injury, left, subsequent encounter S49  92XD    3  Acute pain of left shoulder M25 512                   Subjective: Pt offers no new comments and denies all L shoulder pain at rest       Objective: See treatment diary below      Assessment: Tolerated treatment well  Progressed weight during a Lat Pull down to increase strength of the LUE  No exacerbation of L shoulder pain this date  Patient demonstrated fatigue post treatment and would benefit from continued PT      Plan: Continue per plan of care        Precautions: none  Re-cert due 3/46/50  Manual  1/10 1/13 1/15 1/2 1/6   L shoulder PROM to jayda NP NP NP x5 mins    Focus on IR NP                       Exercise Diary  1/10 1/13 1/15 1/2 1/6   HEP instruction        Pulleys    DC --   Isometric shoulder IR/ER Blue TB 90-90 ER 2x10 ea Blue TB 90-90 ER 2x10 ea Blue TB 90-90 ER 2x10 ea Resume NV at 90-90 position Blue TB IR/ER 2x10 ea   MTP with TB    DC --   LTP with TB    DC --   Prone Ext    DC --   Prone Hrz ABD    DC --   Prone Flex    DC --   Prone Row    DC --   UBE 60 rpm 5'fwd/5'retro 60 rpm 5'fwd/5'retro 60 rpm 5'fwd/5'retro 70 rpm 5'fwd/5'retro 70 rpm 5'fwd/5'retro   Standing b/l shoulder flexion to 90* 5# 2x10 5# 2x10 5# 2x10 Resume NV 4# 2x10   Standing b/l shoulder scaption to 90* 5# 2x10 5# 2x10 5# 2x10 Resume NV 4# 2x10           Rhythmic stabilization - supine shoulder flex , horiz abd 0-30 deg 5# x20 ea 5# x20 ea 5# x20 ea Resume NV 4# x20 ea   Scap ABCs 5# x1 5# x1 5# x1 Resume NV 4# x1   Rhythmic stabilization: Ball roll against wall Red ball  4 directions  20x ea Red ball  4 directions  20x ea Red ball  4 directions  20x ea Resume NV Red ball  4 directions  20x ea   Body Blade 3 ways   15" x4 ea 3 way 15" x4 ea 3 way 15" x4 ea Resume NV 3 ways   15" x4 ea   Chest Pass Chest Press machine  10# 3x10 Chest Press machine  50# 3x10 Chest Press machine  50# 3x10 Chest Press machine  10# 2x10 Chest Press machine  10# 2x10   Overhead pass unweighted self toss above doorway  x20 unweighted self toss above doorway  x20 unweighted self toss above doorway  x20 Resume NV unweighted self toss above doorway  x20   Cart push-pull 25 feet  55# x10 ea 25 feet  55# x10 ea 25 feet  55# x10 ea 25 feet  50# x10 ea 25 feet  55# x10 ea   Lat pull down machine 50# 3x10 50# 3x10 70# 3x10 50# 2x10 50# 2x10   Biceps curls at cable column Straight bar  40# 3x10 Straight bar  40# 3x10 Straight bar  40# 3x10 Straight bar  40# 2x10 Straight bar  40# 2x10   Triceps extensions at cable column Rope  50# 3x10 Rope  50# 3x10 Rope  50# 3x10 Rope  50# 2x10 Rope  50# 2x10   Seated row machine 40# 3x10 40# 3x10 40# 3x10 40# 2x10 40# 2x10   Seated overhead shoulder press Dumbbells   4# 2x10 Dumbbells   5# 2x10 Dumbbells   5# 2x10 Dumbbells   2-3# NV Dumbbells   3# 2x10   TB wall walks Green TB 5 steps x5 Green TB 5 steps 2x5 Green TB 5 steps 2x5     Wall push ups Green TB 2x10 Green TB 2x10 Green TB 2x10 NV    Sleeper stretch sidelying 10"x10 10"x10 10"x10 10"x10 10"x10   rhythmic stabilization overhead with TB  x5 reps w/ Green TB x5 reps w/ Green TB             Modalities        CP to L shoulder post tx    x10 mins

## 2020-01-17 ENCOUNTER — OFFICE VISIT (OUTPATIENT)
Dept: PHYSICAL THERAPY | Facility: CLINIC | Age: 40
End: 2020-01-17
Payer: OTHER MISCELLANEOUS

## 2020-01-17 DIAGNOSIS — M25.512 ACUTE PAIN OF LEFT SHOULDER: ICD-10-CM

## 2020-01-17 DIAGNOSIS — Z98.890 STATUS POST ARTHROSCOPY OF LEFT SHOULDER: Primary | ICD-10-CM

## 2020-01-17 DIAGNOSIS — S49.92XD ACROMIOCLAVICULAR JOINT INJURY, LEFT, SUBSEQUENT ENCOUNTER: ICD-10-CM

## 2020-01-17 PROCEDURE — 97110 THERAPEUTIC EXERCISES: CPT

## 2020-01-17 NOTE — PROGRESS NOTES
Daily Note     Today's date: 2020  Patient name: Dann Nye  : 1980  MRN: [id number]  Referring provider: Kassie Russ MD  Dx:   Encounter Diagnosis     ICD-10-CM    1  Status post arthroscopy of left shoulder Z98 890    2  Acromioclavicular joint injury, left, subsequent encounter S49  92XD    3  Acute pain of left shoulder M25 512                   Subjective: Pt reports that his shoulder is "feeling good " Pt denies pain of the L shoulder at rest       Objective: See treatment diary below      Assessment: Tolerated treatment well  Pt has no exacerbation of L shoulder symptoms this date  Patient demonstrated fatigue post treatment and would benefit from continued PT      Plan: Continue per plan of care        Precautions: none  Re-cert due 18  Manual  1/10 1/13 1/15 1/17 1/6                               Exercise Diary  1/10 1/13 1/15 1/17 1/6   Isometric shoulder IR/ER Blue TB 90-90 ER 2x10 ea Blue TB 90-90 ER 2x10 ea Blue TB 90-90 ER 2x10 ea Blue TB 90-90 ER 2x10 ea Blue TB IR/ER 2x10 ea   UBE 60 rpm 5'fwd/5'retro 60 rpm 5'fwd/5'retro 60 rpm 5'fwd/5'retro 60 rpm 5'fwd/5'retro 70 rpm 5'fwd/5'retro   Standing b/l shoulder flexion to 90* 5# 2x10 5# 2x10 5# 2x10 5# 2x10 4# 2x10   Standing b/l shoulder scaption to 90* 5# 2x10 5# 2x10 5# 2x10 5# 2x10 4# 2x10   Rhythmic stabilization - supine shoulder flex , horiz abd 0-30 deg 5# x20 ea 5# x20 ea 5# x20 ea 5# 2x10 4# x20 ea   Scap ABCs 5# x1 5# x1 5# x1 5# x1 4# x1   Rhythmic stabilization: Ball roll against wall Red ball  4 directions  20x ea Red ball  4 directions  20x ea Red ball  4 directions  20x ea Red ball  4 directions  20x ea Red ball  4 directions  20x ea   Body Blade 3 ways   15" x4 ea 3 way 15" x4 ea 3 way 15" x4 ea 4 way 15" x4 ea 3 ways   15" x4 ea   Chest Pass Chest Press machine  10# 3x10 Chest Press machine  50# 3x10 Chest Press machine  50# 3x10 Chest Press machine  50# 3x10 Chest Press machine  10# 2x10   Overhead pass unweighted self toss above doorway  x20 unweighted self toss above doorway  x20 unweighted self toss above doorway  x20 unweighted self toss above doorway  x20 unweighted self toss above doorway  x20   Cart push-pull 25 feet  55# x10 ea 25 feet  55# x10 ea 25 feet  55# x10 ea 25 feet  50# x10 ea 25 feet  55# x10 ea   Lat pull down machine 50# 3x10 50# 3x10 70# 3x10 70# 3x10 50# 2x10   Biceps curls at cable column Straight bar  40# 3x10 Straight bar  40# 3x10 Straight bar  40# 3x10 Straight bar  40# 3x10 Straight bar  40# 2x10   Triceps extensions at cable column Rope  50# 3x10 Rope  50# 3x10 Rope  50# 3x10 Rope  50# 3x10 Rope  50# 2x10   Seated row machine 40# 3x10 40# 3x10 40# 3x10 40# 3x10 40# 2x10   Seated overhead shoulder press Dumbbells   4# 2x10 Dumbbells   5# 2x10 Dumbbells   5# 2x10 Dumbbells   5#  Dumbbells   3# 2x10   TB wall walks Green TB 5 steps x5 Green TB 5 steps 2x5 Green TB 5 steps 2x5 Green TB 5 steps 2x5    Wall push ups Green TB 2x10 Green TB 2x10 Green TB 2x10 Green TB 2x10    TB ITY on wall    Black TB x10 ea    Sleeper stretch sidelying 10"x10 10"x10 10"x10 10"x10 10"x10   rhythmic stabilization overhead with TB  x5 reps w/ Henrine Sluder TB x5 reps w/ Henrine Sluder TB x5 reps w/ Henrine Sluder TB

## 2020-01-20 ENCOUNTER — OFFICE VISIT (OUTPATIENT)
Dept: PHYSICAL THERAPY | Facility: CLINIC | Age: 40
End: 2020-01-20
Payer: OTHER MISCELLANEOUS

## 2020-01-20 DIAGNOSIS — S49.92XD ACROMIOCLAVICULAR JOINT INJURY, LEFT, SUBSEQUENT ENCOUNTER: ICD-10-CM

## 2020-01-20 DIAGNOSIS — M25.512 ACUTE PAIN OF LEFT SHOULDER: ICD-10-CM

## 2020-01-20 DIAGNOSIS — Z98.890 STATUS POST ARTHROSCOPY OF LEFT SHOULDER: Primary | ICD-10-CM

## 2020-01-20 PROCEDURE — 97110 THERAPEUTIC EXERCISES: CPT

## 2020-01-20 NOTE — PROGRESS NOTES
Daily Note     Today's date: 2020  Patient name: Gm Fountain  : 1980  MRN: [id number]  Referring provider: Taz Wise MD  Dx:   Encounter Diagnosis     ICD-10-CM    1  Status post arthroscopy of left shoulder Z98 890    2  Acromioclavicular joint injury, left, subsequent encounter S49  92XD    3  Acute pain of left shoulder M25 512                   Subjective: Pt denies all L shoulder pain at rest        Objective: See treatment diary below      Assessment: Tolerated treatment well  Progressed weight during multiple machine exercises to strengthen the LUE  No exacerbation of pain symptoms this date  Patient demonstrated fatigue post treatment and would benefit from continued PT      Plan: Continue per plan of care        Precautions: none  Re-cert due 54  Manual  1/10 1/13 1/15 1/17 1/20                               Exercise Diary  1/10 1/13 1/15 1/17 1/20   Isometric shoulder IR/ER Blue TB 90-90 ER 2x10 ea Blue TB 90-90 ER 2x10 ea Blue TB 90-90 ER 2x10 ea Blue TB 90-90 ER 2x10 ea Blue TB 90-90 ER 2x10 ea   UBE 60 rpm 5'fwd/5'retro 60 rpm 5'fwd/5'retro 60 rpm 5'fwd/5'retro 60 rpm 5'fwd/5'retro 60 rpm 5'fwd/5'retro   Standing b/l shoulder flexion to 90* 5# 2x10 5# 2x10 5# 2x10 5# 2x10 5# 2x10   Standing b/l shoulder scaption to 90* 5# 2x10 5# 2x10 5# 2x10 5# 2x10 5# 2x10   Rhythmic stabilization - supine shoulder flex , horiz abd 0-30 deg 5# x20 ea 5# x20 ea 5# x20 ea 5# 2x10 5# x20 ea   Scap ABCs 5# x1 5# x1 5# x1 5# x1 5# x1   Rhythmic stabilization: Ball roll against wall Red ball  4 directions  20x ea Red ball  4 directions  20x ea Red ball  4 directions  20x ea Red ball  4 directions  20x ea Red ball  4 directions  20x ea   Body Blade 3 ways   15" x4 ea 3 way 15" x4 ea 3 way 15" x4 ea 4 way 15" x4 ea 4 ways   15" x4 ea   Chest Pass Chest Press machine  10# 3x10 Chest Press machine  50# 3x10 Chest Press machine  50# 3x10 Chest Press machine  50# 3x10 Chest Press machine  60# 3x10   Overhead pass unweighted self toss above doorway  x20 unweighted self toss above doorway  x20 unweighted self toss above doorway  x20 unweighted self toss above doorway  x20 unweighted self toss above doorway  x20   Cart push-pull 25 feet  55# x10 ea 25 feet  55# x10 ea 25 feet  55# x10 ea 25 feet  50# x10 ea 25 feet  55# x10 ea   Lat pull down machine 50# 3x10 50# 3x10 70# 3x10 70# 3x10 800# 3x10   Biceps curls at cable column Straight bar  40# 3x10 Straight bar  40# 3x10 Straight bar  40# 3x10 Straight bar  40# 3x10 Straight bar  40# 3x10   Triceps extensions at cable column Rope  50# 3x10 Rope  50# 3x10 Rope  50# 3x10 Rope  50# 3x10 Rope  50# 3x10   Seated row machine 40# 3x10 40# 3x10 40# 3x10 40# 3x10 50# 3x10   Seated overhead shoulder press Dumbbells   4# 2x10 Dumbbells   5# 2x10 Dumbbells   5# 2x10 Dumbbells   5#  Dumbbells   5# 3x10   TB wall walks Green TB 5 steps x5 Green TB 5 steps 2x5 Green TB 5 steps 2x5 Green TB 5 steps 2x5 Green TB 5 steps 2x5   Wall push ups Green TB 2x10 Green TB 2x10 Green TB 2x10 Green TB 2x10 Green TB 2x10   TB ITY on wall    Black TB x10 ea Black TB x10 ea   Sleeper stretch sidelying 10"x10 10"x10 10"x10 10"x10 10"x10   rhythmic stabilization overhead with TB  x5 reps w/ [employer] D[employer] TB x5 reps w/ [place] [employer] TB x5 reps w/ [place] [employer] TB x5 reps w/ [employer] [employer] TB

## 2020-01-22 ENCOUNTER — OFFICE VISIT (OUTPATIENT)
Dept: PHYSICAL THERAPY | Facility: CLINIC | Age: 40
End: 2020-01-22
Payer: OTHER MISCELLANEOUS

## 2020-01-22 DIAGNOSIS — M25.512 ACUTE PAIN OF LEFT SHOULDER: ICD-10-CM

## 2020-01-22 DIAGNOSIS — Z98.890 STATUS POST ARTHROSCOPY OF LEFT SHOULDER: Primary | ICD-10-CM

## 2020-01-22 DIAGNOSIS — S49.92XD ACROMIOCLAVICULAR JOINT INJURY, LEFT, SUBSEQUENT ENCOUNTER: ICD-10-CM

## 2020-01-22 PROCEDURE — 97110 THERAPEUTIC EXERCISES: CPT

## 2020-01-22 NOTE — PROGRESS NOTES
Daily Note     Today's date: 2020  Patient name: Bruno Moulton  : 1980  MRN: [id number]  Referring provider: Carine Gardner MD  Dx:   Encounter Diagnosis     ICD-10-CM    1  Status post arthroscopy of left shoulder Z98 890    2  Acromioclavicular joint injury, left, subsequent encounter S49  92XD    3  Acute pain of left shoulder M25 512                   Subjective: Pt reports that he feels ready to return to work and denies all L shoulder pain  Objective: See treatment diary below      Assessment: Tolerated treatment well  No exacerbation of symptoms  Patient demonstrated fatigue post treatment and would benefit from continued PT      Plan: Continue per plan of care        Precautions: none  Re-cert due       Exercise Diary  1/22 1/13 1/15 1/17 1/20   Isometric shoulder IR/ER Blue TB 90-90 ER 2x10 ea Blue TB 90-90 ER 2x10 ea Blue TB 90-90 ER 2x10 ea Blue TB 90-90 ER 2x10 ea Blue TB 90-90 ER 2x10 ea   UBE 60 rpm 5'fwd/5'retro 60 rpm 5'fwd/5'retro 60 rpm 5'fwd/5'retro 60 rpm 5'fwd/5'retro 60 rpm 5'fwd/5'retro   Standing b/l shoulder flexion to 90* 5# 2x10 5# 2x10 5# 2x10 5# 2x10 5# 2x10   Standing b/l shoulder scaption to 90* 5# 2x10 5# 2x10 5# 2x10 5# 2x10 5# 2x10   Rhythmic stabilization - supine shoulder flex , horiz abd 0-30 deg 5# x20 ea 5# x20 ea 5# x20 ea 5# 2x10 5# x20 ea   Scap ABCs 5# x1 5# x1 5# x1 5# x1 5# x1   Rhythmic stabilization: Ball roll against wall Red ball  4 directions  20x ea Red ball  4 directions  20x ea Red ball  4 directions  20x ea Red ball  4 directions  20x ea Red ball  4 directions  20x ea   Body Blade 4 ways   15" x4 ea 3 way 15" x4 ea 3 way 15" x4 ea 4 way 15" x4 ea 4 ways   15" x4 ea   Chest Pass Chest Press machine  60# 3x10 Chest Press machine  50# 3x10 Chest Press machine  50# 3x10 Chest Press machine  50# 3x10 Chest Press machine  60# 3x10   Overhead pass unweighted self toss above doorway  x20 unweighted self toss above doorway  x20 unweighted self toss above doorway  x20 unweighted self toss above doorway  x20 unweighted self toss above doorway  x20   Cart push-pull 25 feet  55# x10 ea 25 feet  55# x10 ea 25 feet  55# x10 ea 25 feet  50# x10 ea 25 feet  55# x10 ea   Lat pull down machine 80# 3x10 50# 3x10 70# 3x10 70# 3x10 80# 3x10   Biceps curls at cable column Straight bar  40# 3x10 Straight bar  40# 3x10 Straight bar  40# 3x10 Straight bar  40# 3x10 Straight bar  40# 3x10   Triceps extensions at cable column Rope  50# 3x10 Rope  50# 3x10 Rope  50# 3x10 Rope  50# 3x10 Rope  50# 3x10   Seated row machine 50# 3x10 40# 3x10 40# 3x10 40# 3x10 50# 3x10   Seated overhead shoulder press Dumbbells   5# 2x10 Dumbbells   5# 2x10 Dumbbells   5# 2x10 Dumbbells   5#  Dumbbells   5# 3x10   TB wall walks Green TB 5 steps 2x5 Green TB 5 steps 2x5 Green TB 5 steps 2x5 Green TB 5 steps 2x5 Green TB 5 steps 2x5   Wall push ups Green TB 2x10 Green TB 2x10 Green TB 2x10 Green TB 2x10 Green TB 2x10   TB ITY on wall Black TB x10 ea   Black TB x10 ea Black TB x10 ea   Sleeper stretch sidelying 10"x10 10"x10 10"x10 10"x10 10"x10   rhythmic stabilization overhead with TB x5 reps w/ Carlita Gene TB x5 reps w/ Carlita Gene TB x5 reps w/ Carlita Gene TB x5 reps w/ Carlita Gene TB x5 reps w/ Carlita Gene TB

## 2020-01-24 ENCOUNTER — OFFICE VISIT (OUTPATIENT)
Dept: PHYSICAL THERAPY | Facility: CLINIC | Age: 40
End: 2020-01-24
Payer: OTHER MISCELLANEOUS

## 2020-01-24 DIAGNOSIS — S49.92XD ACROMIOCLAVICULAR JOINT INJURY, LEFT, SUBSEQUENT ENCOUNTER: ICD-10-CM

## 2020-01-24 DIAGNOSIS — Z98.890 STATUS POST ARTHROSCOPY OF LEFT SHOULDER: Primary | ICD-10-CM

## 2020-01-24 DIAGNOSIS — M25.512 ACUTE PAIN OF LEFT SHOULDER: ICD-10-CM

## 2020-01-24 PROCEDURE — 97110 THERAPEUTIC EXERCISES: CPT | Performed by: PHYSICAL THERAPIST

## 2020-01-24 PROCEDURE — 97535 SELF CARE MNGMENT TRAINING: CPT | Performed by: PHYSICAL THERAPIST

## 2020-01-24 NOTE — PROGRESS NOTES
PT Discharge Summary    Today's date: 2020  Patient name: Ba Donaldson  : 1980  MRN: [id number]  Referring provider: Diego Sadler MD  Dx:   Encounter Diagnosis     ICD-10-CM    1  Status post arthroscopy of left shoulder Z98 [id number]    2  Acromioclavicular joint injury, left, subsequent encounter S49  [id number]    3  Acute pain of left shoulder M25 [id number]                   Assessment  Ba Donaldson has demonstrated decreased pain, increased strength, increased range of motion, and increased activity tolerance since starting physical therapy services  They report an overall improvement of 90-95% thus far  He has tolerated progression of weight lifting and he demonstrates readiness to RTW without restrictions  At this time, patient has achieved their maximum functional benefit from skilled physical therapy services and will be discharged to their HEP  Patient was provided with finalized written copy of HEP  Patient is in agreement with the plan of care  As a result, patient is discharged from physical therapy      Goals    STGs  1) In 4 weeks patient will report reduced pain 50% or greater, without pain medication  -MET  2) In 4 weeks patient will demonstrate independence with HEP -MET  5) In 4 weeks patient will demonstrate L shoulder strength of 4-/5 or greater in all directions with MMT   -MET    LTGs  1) In 4-8 weeks patient will demonstrate pain free L shoulder ROM WNL all directions - MET  2) In 4-8 weeks patient will demonstrate L shoulder strength of at least 4/5 MMT all directions -MET  3) in 4-8 weeks patient will demonstrate ability to lift at least 10 lbs above shoulder height with L UE - MET  4) In 8-12 weeks patient will demonstrate ability to lift/lower 50 lbs using b/l UEs from overhead to waist height  - MET    Plan  DC to HEP, encouraged use of home gym or community gym to continue strength trainining        Subjective Evaluation    History of Present Illness  Date of onset: 8/23/2019  Mechanism of injury: Patient presents to PT after work related injury  Patient is employed as a  at [employer]  Reports injury on 8/23/19 to L shoulder when attempting to lift a 10 lb box from about waist height and felt an immediate sharp pain in the L shoulder  He stopped working at that moment, reported to his manager, went on lunch and the pain worsened  Saw WC doc that same day  Had xrays but no MRI at this time  Patient saw ortho doc (referring Dr Augustus Councilman) on Tues 9/17 and received steroid injection which provided much relief from pain on the day of and day after the injection  Reports the "grinding" is also less since the injection  Pain is primarily L shoulder superior and anterior  Occasionally feels "sore" into L elbow and forearm  Reports intermittent "numbness" along lateral aspect of forearm if he rests arm on pillow  Pain overall has gradually improved since the injury occurred  He uses a sling when out in public to avoid using his arm  RA 10/14:  Patient reports he is feeling better since he started PT  He reports compliance with lifting restrictions at home and at work, however he still has pain with repetitive use of L UE, WB onto L UE and with reaching/pushing/pulling  Nolvia has pain with quick sudden movements  He feels about 50% improved since his SOC  Reports that he no longer experiences the intermittent "numbness" along lateral aspect of forearm  Still using his sling when out in public and when at work not having to use L UE  RA 11/7:  Patient returns to PT after surgery on L shoulder 10/31/19  He denies any complications from the surgery  He reports nerve block lasted 3 days before wearing off  He now takes  mg as needed, usually in the morning  Takes prescribed oxycodone as needed as prescribed, but usually only needs to take once a day before bed  Patient is using his sling when "out and about" but does not use it when inside   Overall he feels a little less functional right now compared to before surgery, but feels it is already starting to feel better as the days go on  Patient has a f/u with surgeon in beginning of December  Will not be working at least until that follow up  Progress note :  Patient reports his shoulder is "better than it was" before surgery  He currently reports difficulty/pain with any sudden movements of the arm or with certain isometric resistance such as when attempting to open  when it is locked closed or with pulling blankets over in bed  Had difficulty using L hand/arm to stabilize when carving turkey on Thanksgiving day  Also notes discomfort/strain with overhead activity  Progress Note 20:  Patient reports he feels about 75% recovered at this time  At this time patient reports biggest challenge at this time is "heavy lifting"  Heaviest object lifted until this point was recently his dog (weighs 50 lbs)  He reports lifting her from the floor to waist with both hands and can feel a pain/discomfort in his left shoulder  He also notes some slight "popping" in the shoulder if he is reaching forward  He reports it's not consistent, not necessarily only with sudden movements but thinks he notices it more when moving after sitting/resting for a period of time  Patient also reports he cannot sleep with his left arm up above his head, needs to keep his arm more by his side or rested on belly  He reports he has not been needing to take any medication for pain and rarely needs to use ice  Discharge 2020:  Patient states he feel 90-95% recovered  Reports it's "little things" like pulling on his waistband that can bother the shoulder but he has been working without complaints  He will be returning to "full duty" on Monday         Pain  IE    RA 10/14: RA : : 20: 20:  Current pain ratin  3  3  1 0 0  At best pain ratin  0  1 (w/meds) 0 0 0  At worst pain ratin  7  5  5 3 0  Quality: ache    Relieving factors: ice and medications  RA 10/14: takes Aleve or Tylenol prn (reports taking this about every other day), uses ice daily  RA 11/7: support (sling), rx medications, ice (using machine that was provided after surgery)  12/9: takes OTC IBU after PT  1/2: has not needed any medication per patient    Aggravating factors: overhead activity and lifting  RA 10/14: patient has been compliant with not lifting >10 lbs, pain increases with WB/leaning onto L UE and when using L UE for prolonged periods (hand held computer , etc) , pain with lowering L UE from elevated position and with reaching forward, pushing/pulling  RA 11/7: lifting  12/9: OHA, sudden movement  1/2: lifting > 40 lbs    Social Support  Lives in: [facility]  Lives with: spouse and young children    Employment status: not working (employed as a  at [employer], currently injured)  Hand dominance: left  Exercise history: daily walks    Treatments  Current treatment: injection treatment and medication  Patient Goals  Patient goals for therapy: return to work and decreased pain          Objective     Tenderness     Left Shoulder   Tenderness in the [facility] joint, acromion, clavicle and coracoid process  No tenderness in the biceps tendon (proximal), bicipital groove, infraspinatus tendon, lateral scapula, medial scapula, scapular spine, SC joint, subscapularis tendon and supraspinatus tendon     RA 10/14: (+) ttp persists on L AC jt, infraspinatus and supraspinatus tendons      Active Range of Motion    Cervical  Flexion:  WFL  Extension:  WFL  Left lateral flexion:  WFL  Right lateral flexion:  WFL  Left rotation:  WFL  Right rotation:  [facility]      RA 10/14    Left Shoulder  Flexion: 110 degrees with pain  135 deg with pain  Extension: 30 degrees    65 deg  Abduction: 60 degrees with pain  60 deg (pain in AC jt), can lift to 150* with pain  External rotation BTH: T1 with pain  T6  Internal rotation BTB: L2 with pain  L1 with pain    RA 11/7:    12/9:     1/2:   1/24/20:   Flexion: 145 deg w/pain  170 deg (arc of pain  deg) 170* [email or web address] 175*   Extension: 25 deg w/pain  40 deg w/discomfort   65* w/"tightness" 65* w/"tightness"  Abduction: 85 deg w/pain  170 deg w/discomfort   170* [email or web address]* 175*   External rotation BTH: C6 w/pain T6     T6   T6  Internal rotation BTB: T11  T10 w/pain    T10 w/"pulling"  T8    Additional Active Range of Motion Details  Pain reproduced with all AROM  Extension, IR, ER >> abduction and flexion  RA 10/14: pain reproduced with AROM flexion, abduction and IR  RA 11/7: pain reproduced w/AROM flexion beginning at 90 deg and moves slowly to continue raising arm, reports "strain"   1/2: pain at end range flexion, "tightness" with extension >30*, pain at mid-range abduction, "painful pull" anterior shoulder with end range IR    Passive Range of Motion   IE      RA 10/14    Left Shoulder   Flexion: [facility] pain free    Abduction: 50 degrees with pain  150 deg pain free   External rotation 90°: 65 degrees   90+ deg pain free   Internal rotation 90°: 40 degrees with pain 50 deg pain free      11/7:    12/9:  Flexion: 152 deg    WNL pain free  Abduction: 120 deg   160 deg  External rotation @ 90: 70 deg 90+ deg pain free  Internal rotation @ 90: 25 deg  52 deg    Strength/Myotome Testing   IE     RA 10/14 RA 11/7: 12/9:  1/2 1/24/20:  Left Shoulder     Planes of Motion   Flexion: 4-     4+  NT  4+w/pain 4+ 5  Extension: 4-     4+  NT  4+w/pain 4+ 5  Abduction: 4-     4- with pain NT  4+  5 5  External rotation at 0°: 4-   4+  NT  4+  5 5  External rotation at 90°: 4-  4+  NT  4+  5 5    Additional Strength Details  MMT tested with shoulder shoulder neutral (arm by side)   11/7: NT due to recent surgery  Will test at next assessment  1/2: "strain" with MMT flex and abd      Tests     Left Shoulder   Positive anterior load and shift, scapular relocation  and scapular assistance test positive  Negative belly press, clunk, crank, Hawkin's, Neer's, painful arc, passive horizontal adduction and bicep load      RA 10/14: (-) anterior load and shift, (+) scapular relocation, (+) scapular assistance test  RA 11/7: N/A, post-op      Flowsheet Rows      Most Recent Value   PT/OT G-Codes   Current Score  67 (initial evaluation = 32)   Projected Score  67        Precautions: none  Exercise Diary  1/22 1/24 1/15 1/17 1/20   Isometric shoulder IR/ER Blue TB 90-90 ER 2x10 ea Blue TB 90-90 ER 2x10 ea Blue TB 90-90 ER 2x10 ea Blue TB 90-90 ER 2x10 ea Blue TB 90-90 ER 2x10 ea   UBE 60 rpm 5'fwd/5'retro 60 rpm 5'fwd/5'retro 60 rpm 5'fwd/5'retro 60 rpm 5'fwd/5'retro 60 rpm 5'fwd/5'retro   Standing b/l shoulder flexion to 90* 5# 2x10 5# 2x10 5# 2x10 5# 2x10 5# 2x10   Standing b/l shoulder scaption to 90* 5# 2x10 5# 2x10 5# 2x10 5# 2x10 5# 2x10   Rhythmic stabilization - supine shoulder flex , horiz abd 0-30 deg 5# x20 ea 5# x20 ea 5# x20 ea 5# 2x10 5# x20 ea   Scap ABCs 5# x1 5# x1 5# x1 5# x1 5# x1   Rhythmic stabilization: Ball roll against wall Red ball  4 directions  20x ea Red ball  4 directions  20x ea Red ball  4 directions  20x ea Red ball  4 directions  20x ea Red ball  4 directions  20x ea   Body Blade 4 ways   15" x4 ea 3 way 15" x4 ea 3 way 15" x4 ea 4 way 15" x4 ea 4 ways   15" x4 ea   Chest Pass Chest Press machine  60# 3x10  Chest Press machine  50# 3x10 Chest Press machine  50# 3x10 Chest Press machine  60# 3x10   Overhead pass unweighted self toss above doorway  x20  unweighted self toss above doorway  x20 unweighted self toss above doorway  x20 unweighted self toss above doorway  x20   Cart push-pull 25 feet  55# x10 ea  25 feet  55# x10 ea 25 feet  50# x10 ea 25 feet  55# x10 ea   Lat pull down machine 80# 3x10  70# 3x10 70# 3x10 80# 3x10   Biceps curls at cable column Straight bar  40# 3x10  Straight bar  40# 3x10 Straight bar  40# 3x10 Straight bar  40# 3x10   Triceps extensions at cable column Rope  50# 3x10  Rope  50# 3x10 Rope  50# 3x10 Rope  50# 3x10   Seated row machine 50# 3x10  40# 3x10 40# 3x10 50# 3x10   Seated overhead shoulder press Dumbbells   5# 2x10 Dumbbells   5# 2x10 Dumbbells   5# 2x10 Dumbbells   5#  Dumbbells   5# 3x10   TB wall walks Green TB 5 steps 2x5 Green TB 5 steps 2x5 Green TB 5 steps 2x5 Green TB 5 steps 2x5 Green TB 5 steps 2x5   Wall push ups Green TB 2x10 Green TB 2x10 Green TB 2x10 Green TB 2x10 Green TB 2x10   TB ITY on wall Black TB x10 ea Black TB x10 ea  Black TB x10 ea Black TB x10 ea   Sleeper stretch sidelying 10"x10 10"x10 10"x10 10"x10 10"x10   rhythmic stabilization overhead with TB x5 reps w/ [employer] TB x5 reps w/ [employer] TB x5 reps w/ [employer] TB x5 reps w/ [employer] TB x5 reps w/ Bradly Mao TB

## 2020-02-14 ENCOUNTER — OFFICE VISIT (OUTPATIENT)
Dept: OBGYN CLINIC | Facility: CLINIC | Age: 40
End: 2020-02-14
Payer: OTHER MISCELLANEOUS

## 2020-02-14 VITALS
WEIGHT: 177.7 LBS | DIASTOLIC BLOOD PRESSURE: 83 MMHG | SYSTOLIC BLOOD PRESSURE: 132 MMHG | BODY MASS INDEX: 27.89 KG/M2 | HEIGHT: 67 IN

## 2020-02-14 DIAGNOSIS — Z98.890 STATUS POST ARTHROSCOPY OF LEFT SHOULDER: Primary | ICD-10-CM

## 2020-02-14 PROCEDURE — 99213 OFFICE O/P EST LOW 20 MIN: CPT | Performed by: ORTHOPAEDIC SURGERY

## 2020-02-14 NOTE — PROGRESS NOTES
Chief Complaint   status post left shoulder arthroscopy    History Of Presenting Illness  Baby Fat 1980 presents with  Left shoulder arthroscopy done on October 31st, 2019  Patient pleased with the results  Patient has no complaints of the shoulder  Patient has been able to go back to his job  As a       Current Medications  Current Outpatient Medications   Medication Sig Dispense Refill    ibuprofen (MOTRIN) 800 mg tablet Take 400 mg by mouth every 6 (six) hours as needed for mild pain       naproxen sodium (ALEVE) 220 MG tablet Take 220 mg by mouth 2 (two) times a day with meals       No current facility-administered medications for this visit          Current Problems    Active Problems:   Patient Active Problem List    Diagnosis Date Noted    SLAP lesion, type II, left, subsequent encounter 10/23/2019         Review of Systems:    General: negative for - chills, fatigue, fever,  weight gain or weight loss  Psychological: negative for - anxiety, behavioral disorder, concentration difficulties  Ophthalmic: negative for - blurry vision, decreased vision, double vision,      Past Medical History:   Past Medical History:   Diagnosis Date    DDD (degenerative disc disease), cervical     H/O shoulder surgery     left side    Neck pain     Occasional    PONV (postoperative nausea and vomiting)     Seasonal allergies     Wears glasses        Past Surgical History:   Past Surgical History:   Procedure Laterality Date    CONTROL BLEED POST TONSILLECTOMY  10/31/2017    Procedure: CONTROL BLEED POST TONSILLECTOMY;  Surgeon: Zachariah Montoya DO;  Location: [facility];  Service: ENT    HEMORRHOID SURGERY      x2    WI REMOVAL OF TONSILS,12+ Y/O N/A 10/31/2017    Procedure: TONSILLECTOMY;  Surgeon: Zachariah Montoya DO;  Location: [facility];  Service: ENT    WI SHLDR ARTHROSCOP,SURG,REPAIR,SLAP LESION Left 10/31/2019    Procedure: ARTHROSCOPY SHOULDER,  AC Joint Resection, SAD, Synovectomy; Surgeon: Jazmín Diop MD;  Location: [facility];  Service: Orthopedics    SHOULDER SURGERY Right     x2- decompression    TONSILLECTOMY      WISDOM TOOTH EXTRACTION         Family History:  Family history reviewed and non-contributory  Family History   Problem Relation Age of Onset    Graves' disease Mother     Hypertension Father     Cancer Paternal Uncle        Social History:  Social History     Socioeconomic History    Marital status: /Civil Union     Spouse name: None    Number of children: None    Years of education: None    Highest education level: None   Occupational History    None   Social Needs    Financial resource strain: None    Food insecurity:     Worry: None     Inability: None    Transportation needs:     Medical: None     Non-medical: None   Tobacco Use    Smoking status: Former Smoker     Packs/day:  00     Years: 12      Pack years: 12      Types: Cigarettes     Last attempt to quit:      Years since quittin     Smokeless tobacco: Never Used   Substance and Sexual Activity    Alcohol use: Yes     Comment: 1 weekly    Drug use: No    Sexual activity: None   Lifestyle    Physical activity:     Days per week: None     Minutes per session: None    Stress: None   Relationships    Social connections:     Talks on phone: None     Gets together: None     Attends Latter day service: None     Active member of club or organization: None     Attends meetings of clubs or organizations: None     Relationship status: None    Intimate partner violence:     Fear of current or ex partner: None     Emotionally abused: None     Physically abused: None     Forced sexual activity: None   Other Topics Concern    None   Social History Narrative    None       Allergies:   No Known Allergies        Physical ExaminationBP 132/83 (BP Location: Right arm, Patient Position: Sitting, Cuff Size: Standard)   Ht 5' 7" (1 702 m)   Wt 80 6 kg (177 lb 11 2 oz)   BMI 27 83 kg/m² Gen: Alert and oriented to person, place, time  HEENT: EOMI, eyes clear, moist mucus membranes, hearing intact      Orthopedic Exam   left shoulder portal sites well healed excellent range of pain-free motion Cuff strength 5/5          Impression   stable status post left shoulder arthroscopy      Plan     patient allowed all activities as tolerated   continue home exercise program and conditioning   follow-up ginny Fields MD        Portions of the record may have been created with voice recognition software  Occasional wrong word or "sound a like" substitutions may have occurred due to the inherent limitations of voice recognition software  Read the chart carefully and recognize, using context, where substitutions have occurred

## 2021-03-31 DIAGNOSIS — Z23 ENCOUNTER FOR IMMUNIZATION: ICD-10-CM

## (undated) DEVICE — SKIN MARKER DUAL TIP WITH RULER CAP, FLEXIBLE RULER AND LABELS: Brand: DEVON

## (undated) DEVICE — SUTURE SAVER KIT, QTY 5: Brand: SUTURE SAVER

## (undated) DEVICE — ABDOMINAL PAD: Brand: DERMACEA

## (undated) DEVICE — AIRLIFE™ TRI-FLO™ SUCTION CATHETER WITH CONTROL PORT: Brand: AIRLIFE™

## (undated) DEVICE — THREE-QUARTER SHEET: Brand: CONVERTORS

## (undated) DEVICE — ANTI-FOG SOLUTION WITH FOAM PAD: Brand: DEVON

## (undated) DEVICE — BLADE SHAVER EXCALIBUR 4MM 13CM COOLCUT

## (undated) DEVICE — TUBING SUCTION 5MM X 12 FT

## (undated) DEVICE — GLOVE SRG BIOGEL 8

## (undated) DEVICE — ELECTRODE ELECSURG SUPER TURBOVAC 90 3.75MM X 5.4 IN

## (undated) DEVICE — MEDI-VAC YANKAUER SUCTION HANDLE W/BULBOUS AND CONTROL VENT: Brand: CARDINAL HEALTH

## (undated) DEVICE — SURGICAL GOWN, XL SMARTSLEEVE: Brand: CONVERTORS

## (undated) DEVICE — INTENDED FOR TISSUE SEPARATION, AND OTHER PROCEDURES THAT REQUIRE A SHARP SURGICAL BLADE TO PUNCTURE OR CUT.: Brand: BARD-PARKER ® CARBON RIB-BACK BLADES

## (undated) DEVICE — TUBING ARTHROSCOPIC WAVE  MAIN PUMP

## (undated) DEVICE — GLOVE SRG LF STRL BGL SKNSNS 7 PF

## (undated) DEVICE — Device

## (undated) DEVICE — SYRINGE 20ML LL

## (undated) DEVICE — 1820 FOAM BLOCK NEEDLE COUNTER: Brand: DEVON

## (undated) DEVICE — SHOULDER SUSPENSION KIT 6 PER BOX

## (undated) DEVICE — CHLORAPREP HI-LITE 10.5ML ORANGE

## (undated) DEVICE — DRY-DOC CANNULA WITH DISPOSABLE OBTURATOR, 7.0 X 85 MM: Brand: DRY-DOC

## (undated) DEVICE — BETHLEHEM UNIVERSAL  ARTHRO PK: Brand: CARDINAL HEALTH

## (undated) DEVICE — SCD SEQUENTIAL COMPRESSION COMFORT SLEEVE MEDIUM KNEE LENGTH: Brand: KENDALL SCD

## (undated) DEVICE — 3M™ STERI-DRAPE™ U-DRAPE 1015: Brand: STERI-DRAPE™

## (undated) DEVICE — TRANSPOSAL ULTRAFLEX DUO/QUAD ULTRA CART MANIFOLD

## (undated) DEVICE — 2000CC GUARDIAN II: Brand: GUARDIAN

## (undated) DEVICE — SPONGE TONSIL STRUNG 7/8 IN X-RAY DETECT

## (undated) DEVICE — GAUZE SPONGES,16 PLY: Brand: CURITY

## (undated) DEVICE — OCCLUSIVE GAUZE STRIP,3% BISMUTH TRIBROMOPHENATE IN PETROLATUM BLEND: Brand: XEROFORM

## (undated) DEVICE — SUCTION BOVIE ENT

## (undated) DEVICE — SPONGE LAP 18 X 18 IN

## (undated) DEVICE — GLOVE INDICATOR PI UNDERGLOVE SZ 8 BLUE

## (undated) DEVICE — MAT FLOOR STEP DRI 24 X 36 IN N-STRL

## (undated) DEVICE — SURGI KIT INSTRUMENT ORGANIZER

## (undated) DEVICE — WAND COBLATION  PROCISE XP TONSIL

## (undated) DEVICE — NEEDLE SPINAL18G X 3.5 IN QUINCKE

## (undated) DEVICE — GLOVE SRG BIOGEL ORTHOPEDIC 7

## (undated) DEVICE — LIGHT GLOVE GREEN

## (undated) DEVICE — 3M™ IOBAN™ 2 ANTIMICROBIAL INCISE DRAPE 6648EZ: Brand: IOBAN™ 2

## (undated) DEVICE — CHLORAPREP HI-LITE 26ML ORANGE

## (undated) DEVICE — STERILE T AND A PACK: Brand: CARDINAL HEALTH

## (undated) DEVICE — ALL PURPOSE SPONGES,NON-WOVEN, 4 PLY: Brand: CURITY

## (undated) DEVICE — NEEDLE 21 G X 1